# Patient Record
Sex: FEMALE | Employment: OTHER | URBAN - METROPOLITAN AREA
[De-identification: names, ages, dates, MRNs, and addresses within clinical notes are randomized per-mention and may not be internally consistent; named-entity substitution may affect disease eponyms.]

---

## 2019-02-07 ENCOUNTER — HOSPITAL ENCOUNTER (OUTPATIENT)
Dept: PHYSICAL THERAPY | Age: 79
Discharge: HOME | End: 2019-02-07
Payer: MEDICARE

## 2019-02-07 PROCEDURE — 97110 THERAPEUTIC EXERCISES: CPT

## 2019-02-07 PROCEDURE — 97161 PT EVAL LOW COMPLEX 20 MIN: CPT

## 2019-02-07 NOTE — PROGRESS NOTES
PT DAILY TREATMENT NOTE - [facility]  Patient Name: Abraham Fletcher Date:2019 : 1940 [x]  Patient  Verified Payor: [place] MED[place]ARE / Plan: Christin Oatesy / Product Type: Medicare / In time:10:34  Out time:11:12 Total Treatment Time (min): 38 Visit #: 1 of 10 Medicare/BCBS Only Total Timed Codes (min):  15 1:1 Treatment Time: 45 Treatment Area: Low back pain [M54.5] Cervicalgia [M54.2] SUBJECTIVE Pain Level (0-10 scale): 0 Any medication changes, allergies to medications, adverse drug reactions, diagnosis change, or new procedure performed?: [x] No    [] Yes (see summary sheet for update) Subjective functional status/changes:   [] No changes reported See POC OBJECTIVE 23 min [x]Eval                  []Re-Eval  
 
15 min Therapeutic Exercise:  [] See flow sheet : HEP instruction and demonstration, pt education regarding anatomy and physiology of the spine and LEs and how it relates to the pt's condition. Rationale: increase ROM and increase strength to improve the patients ability to tolerate ADLs With 
 [] TE 
 [] TA 
 [] neuro 
 [] other: Patient Education: [x] Review HEP [] Progressed/Changed HEP based on:  
[] positioning   [] body mechanics   [] transfers   [] heat/ice application   
[] other:   
 
Other Objective/Functional Measures: See evaluation. Pain Level (0-10 scale) post treatment: 0 
 
ASSESSMENT/Changes in Function: Pt given HEP handout to perform. Pt understood exercises in HEP handout. Educated pt that she can continue to use the [facility] as needed for stability. Pt demonstrated decreased AROM, decreased strength, muscle tightness, increased thoracic kyphosis and lumbar lordosis. Pelvic alignment and leg length WNL. L/s AROM did not increase referral right LE pain. Pt would benefit from physical therapy to improve the above impairments to help the pt return to performing ADLs, functional and recreational activities. Patient will continue to benefit from skilled PT services to modify and progress therapeutic interventions, address functional mobility deficits, address ROM deficits, address strength deficits, analyze and address soft tissue restrictions, analyze and cue movement patterns, analyze and modify body mechanics/ergonomics, assess and modify postural abnormalities, address imbalance/dizziness and instruct in home and community integration to attain remaining goals. [x]  See Plan of Care 
[]  See progress note/recertification 
[]  See Discharge Summary Progress towards goals / Updated goals: 
Short Term Goals: To be accomplished in 2 treatments: 1. Pt will report compliance and independence to HEP to help the pt manage their pain and symptoms. Eval: Established Long Term Goals: To be accomplished in 10 treatments: 1. Pt will increase FOTO score to 40 points to improve ability to perform ADLs. Eval: 56 points 2. Pt will increase MMT B hip flex to 4/5, left hip IR/ER to 4/5 to improve ability to tolerate lifting. Eval: B hip flex 4-/5, left hip IR/ER 4-/5 3. Pt will increase AROM c/s EXT to 50 degs, right SB to 20 degs, left SB to WNL, right rotation to WNL, left rotation to 60 degs to improve ability to move her head while driving. Eval: EXT 42 degs, right SB 13 degs, left SB 20 degs, right rotation 58 degs, left rotation 50 degs (all with pulling sensations, and increased c/s flex compensation noted with B SB AROM) 4. Pt will report being able to ambulate 3 blocks around her home with minimal to no increased LBP or right LE/hip pain to improve ability to perform recreational activities. Eval: reports ambulating 3 blocks around her home recently and had increased pain afterwards and needed to sit down because of pain. PLAN [x]  Upgrade activities as tolerated     [x]  Continue plan of care [x]  Update interventions per flow sheet      
[]  Discharge due to:_ 
[]  Other:_   
 
 Bret Oconnor, PT 2/7/2019  11:59 AM 
 
Future Appointments Date Time Provider José Jarvis 2/7/2019 10:30 AM Joie Lazaro, PT [facility]

## 2019-02-07 NOTE — PROGRESS NOTES
[facility] 
[address] 
[phone] fax Plan of Care/ Statement of Necessity for Physical Therapy Services Patient name: Cathy Jarvis Start of Care: 2019 Referral source: Haris Kessler DO : 1940 Medical Diagnosis: Low back pain [M54.5] Cervicalgia [M54.2] Onset Date: Most recent 2018 Treatment Diagnosis: LBP, right hip pain, neck pain Prior Hospitalization: see medical history Provider#: [id number] Medications: Verified on Patient summary List  
 Comorbidities: heart disease, osteoporosis, thyroid problems, hx breast cancer , left TKR  Prior Level of Function: Independent with ADls, functional, and recreational activities with on/off LBP but was able to lift and perform these activities without assistance. The Plan of Care and following information is based on the information from the initial evaluation. Assessment/ key information:  
Pt is a 66year old female who presents to therapy today with LBP, neck pain, and right hip pain. Pt states that her symptoms began in 2018 after sitting more often than normal while being out of town. Pt notices sporadic right buttock and lateral/anterior thigh pain at times as well and \"it feels like it wants to buckle and give out\". Pt reports having on/off LBP/neck pain before this onset but it did not stop her from her daily activities. Pt states she has increased difficulty with lifting, prolonged walking, and trouble reading. Pt states she has been using a SPC at times for ambulation for fear of her right LE giving out. Pt demonstrated decreased AROM, decreased strength, muscle tightness, increased thoracic kyphosis and lumbar lordosis. Pelvic alignment and leg length WNL. L/s AROM did not increase referral right LE pain.  Pt would benefit from physical therapy to improve the above impairments to help the pt return to performing ADLs, functional and recreational activities. Evaluation Complexity History HIGH Complexity :3+ comorbidities / personal factors will impact the outcome/ POC ; Examination HIGH Complexity : 4+ Standardized tests and measures addressing body structure, function, activity limitation and / or participation in recreation  ;Presentation LOW Complexity : Stable, uncomplicated  ;Clinical Decision Making MEDIUM Complexity : FOTO score of 26-74 Overall Complexity Rating: LOW Problem List: pain affecting function, decrease ROM, decrease strength, impaired gait/ balance, decrease ADL/ functional abilitiies, decrease activity tolerance, decrease flexibility/ joint mobility and decrease transfer abilities Treatment Plan may include any combination of the following: Therapeutic exercise, Therapeutic activities, Neuromuscular re-education, Physical agent/modality, Gait/balance training, Manual therapy, Patient education, Self Care training, Functional mobility training, Home safety training and Stair training Patient / Family readiness to learn indicated by: asking questions, trying to perform skills and interest 
Persons(s) to be included in education: patient (P) Barriers to Learning/Limitations: None Patient Goal (s): to strengthed area, so I can take care of my home and self Patient Self Reported Health Status: good Rehabilitation Potential: good Short Term Goals: To be accomplished in 2 treatments: 1. Pt will report compliance and independence to [facility] to help the pt manage their pain and symptoms. Long Term Goals: To be accomplished in 10 treatments: 1. Pt will increase FOTO score to 40 points to improve ability to perform ADLs. 2. Pt will increase MMT B hip flex to 4/5, left hip IR/ER to 4/5 to improve ability to tolerate lifting.  
3. Pt will increase AROM c/s EXT to 50 degs, right SB to 20 degs, left SB to WNL, right rotation to WNL, left rotation to 60 degs to improve ability to move her head while driving. 4. Pt will report being able to ambulate 3 blocks around her home with minimal to no increased LBP or right LE/hip pain to improve ability to perform recreational activities. Frequency / Duration: Patient to be seen 2 times per week for 10 treatments. Patient/ Caregiver education and instruction: Diagnosis, prognosis, self care, activity modification and exercises 
 [x]  Plan of care has been reviewed with PTA Certification Period: 2/7/2019 - 3/8/2019 Heike Skaggs, PT 2/7/2019 10:56 AM 
_____________________________________________________________________ I certify that the above Therapy Services are being furnished while the patient is under my care. I agree with the treatment plan and certify that this therapy is necessary. [de-identified] Signature:____________________  Date:__________Time:______ Please sign and return to [facility] [place] [address] 
[phone] fax

## 2019-02-12 ENCOUNTER — HOSPITAL ENCOUNTER (OUTPATIENT)
Dept: PHYSICAL THERAPY | Age: 79
Discharge: HOME | End: 2019-02-12
Payer: MEDICARE

## 2019-02-12 PROCEDURE — 97110 THERAPEUTIC EXERCISES: CPT

## 2019-02-12 PROCEDURE — 97112 NEUROMUSCULAR REEDUCATION: CPT

## 2019-02-12 NOTE — PROGRESS NOTES
PT DAILY TREATMENT NOTE - [facility]  Patient Name: Efren Jorge Date:2019 : 1940 [x]  Patient  Verified Payor: [place] MEDICARE / Plan: Christin Schneider y / Product Type: Medicare / In time: 2:45  Out time: 3:40 Total Treatment Time (min): 55 Visit #: 2 of 10 Medicare/BCBS Only Total Timed Codes (min):  55 1:1 Treatment Time: 45 Treatment Area: Low back pain [M54.5] Cervicalgia [M54.2] SUBJECTIVE Pain Level (0-10 scale): 1-2 Any medication changes, allergies to medications, adverse drug reactions, diagnosis change, or new procedure performed?: [x] No    [] Yes (see summary sheet for update) Subjective functional status/changes:   [] No changes reported Pt reports having some pain today and it continues to come and go. Pt reports attempting HEP and states she had some trouble with one of the HEP exercises. OBJECTIVE 40 min Therapeutic Exercise:  [x] See flow sheet :   
Rationale: increase ROM and increase strength to improve the patients ability to tolerate ADLs 15 min Neuromuscular Re-education:  [x]  See flow sheet : SB and core/trunk exercises Rationale: increase strength  to improve the patients ability to tolerate functional tasks. With 
 [] TE 
 [] TA 
 [] neuro 
 [] other: Patient Education: [x] Review HEP [] Progressed/Changed HEP based on:  
[] positioning   [] body mechanics   [] transfers   [] heat/ice application   
[] other:   
 
Other Objective/Functional Measures: Initiated exercises/interventions in flow sheet. Pain Level (0-10 scale) post treatment: 1-2 ASSESSMENT/Changes in Function: Reported no change in pain post session and no increased pain with exercises. Educated pt to perform HEP exercises to tolerance and avoid pain. Needed cueing to avoid neck flex compensation with chin tucks in supine. Needs cueing for proper positioning and form of mini squats to improve glute activation and limit UE support/use. Good/Fair TA contraction noted with core/trunk stability exercises. Continue POC as tolerated. Patient will continue to benefit from skilled PT services to modify and progress therapeutic interventions, address functional mobility deficits, address ROM deficits, address strength deficits, analyze and address soft tissue restrictions, analyze and cue movement patterns, analyze and modify body mechanics/ergonomics, assess and modify postural abnormalities, address imbalance/dizziness and instruct in home and community integration to attain remaining goals. []  See Plan of Care 
[]  See progress note/recertification 
[]  See Discharge Summary Progress towards goals / Updated goals: 
Short Term Goals: To be accomplished in 2 treatments: 1. Pt will report compliance and independence to HEP to help the pt manage their pain and symptoms. Eval: Established Current: reports attempting HEP Long Term Goals: To be accomplished in 10 treatments: 1. Pt will increase FOTO score to 40 points to improve ability to perform ADLs. Eval: 56 points 2. Pt will increase MMT B hip flex to 4/5, left hip IR/ER to 4/5 to improve ability to tolerate lifting. Eval: B hip flex 4-/5, left hip IR/ER 4-/5 3. Pt will increase AROM c/s EXT to 50 degs, right SB to 20 degs, left SB to WNL, right rotation to WNL, left rotation to 60 degs to improve ability to move her head while driving. Eval: EXT 42 degs, right SB 13 degs, left SB 20 degs, right rotation 58 degs, left rotation 50 degs (all with pulling sensations, and increased c/s flex compensation noted with B SB AROM) 4. Pt will report being able to ambulate 3 blocks around her home with minimal to no increased LBP or right LE/hip pain to improve ability to perform recreational activities. Eval: reports ambulating 3 blocks around her home recently and had increased pain afterwards and needed to sit down because of pain.   
 
PLAN 
 [x]  Upgrade activities as tolerated     [x]  Continue plan of care [x]  Update interventions per flow sheet      
[]  Discharge due to:_ 
[]  Other:_ Meg Guzman, PT 2/12/2019  3:01 PM 
 
Future Appointments Date Time Provider José Jarvis 2/12/2019  5:00 PM Mathew Carolina, [facility]  
2/15/2019  2:45 PM [facility]  
2/18/2019  9:45 AM Ann Gutierrez, PT [facility]  
2/21/2019  8:15 AM Mathew Carolina, PT [facility]  
2/25/2019  2:00 PM Adin Walters, PT [facility]  
2/28/2019  8:15 AM Ivan Lawrence, PT [facility]

## 2019-02-15 ENCOUNTER — HOSPITAL ENCOUNTER (OUTPATIENT)
Dept: PHYSICAL THERAPY | Age: 79
Discharge: HOME | End: 2019-02-15
Payer: MEDICARE

## 2019-02-15 PROCEDURE — 97110 THERAPEUTIC EXERCISES: CPT

## 2019-02-15 PROCEDURE — 97112 NEUROMUSCULAR REEDUCATION: CPT

## 2019-02-18 ENCOUNTER — HOSPITAL ENCOUNTER (OUTPATIENT)
Dept: PHYSICAL THERAPY | Age: 79
Discharge: HOME | End: 2019-02-18
Payer: MEDICARE

## 2019-02-18 PROCEDURE — 97110 THERAPEUTIC EXERCISES: CPT

## 2019-02-18 PROCEDURE — 97112 NEUROMUSCULAR REEDUCATION: CPT

## 2019-02-18 NOTE — PROGRESS NOTES
PT DAILY TREATMENT NOTE 10-18 Patient Name: Ana Miranda Date:2019 : 1940 [x]  Patient  Verified Payor: [place] MEDICARE / Plan: Christin Mckeon / Product Type: Medicare / In time:940  Out time:1035 Total Treatment Time (min): 55 Visit #: 4 of 10 Medicare/BCBS Only Total Timed Codes (min):  55 1:1 Treatment Time:  50 Treatment Area: Low back pain [M54.5] Cervicalgia [M54.2] SUBJECTIVE Pain Level (0-10 scale): 1-2 Any medication changes, allergies to medications, adverse drug reactions, diagnosis change, or new procedure performed?: [x] No    [] Yes (see summary sheet for update) Subjective functional status/changes:   [] No changes reported Every now and then I get a sharp pain in the middle of my back that started Friday after coming here. OBJECTIVE Modality rationale:   
Type Additional Details  
[] Estim:  []Unatt       []IFC  []Premod []Other:  []w/ice   []w/heat Position: Location:  
[] Estim: []Att    []TENS instruct  []NMES []Other:  []w/US   []w/ice   []w/heat Position: Location:  
[]  Traction: [] Cervical       []Lumbar 
                     [] Prone          []Supine []Intermittent   []Continuous Lbs: 
[] before manual 
[] after manual  
[]  Ultrasound: []Continuous   [] Pulsed []1MHz   []3MHz W/cm2: 
Location:  
[]  Iontophoresis with dexamethasone Location: [] Take home patch  
[] In clinic  
[]  Ice     []  heat 
[]  Ice massage 
[]  Laser  
[]  Anodyne Position: Location:  
[]  Laser with stim 
[]  Other:  Position: Location:  
[]  Vasopneumatic Device Pressure:       [] lo [] med [] hi  
Temperature: [] lo [] med [] hi  
[] Skin assessment post-treatment:  []intact []redness- no adverse reaction 
  []redness  adverse reaction:  
 
10 min Therapeutic Exercise:  [x] See flow sheet :  
 Rationale: increase ROM and increase strength to improve the patients ability to perform ADL 
 
45 min Neuromuscular Re-education:  [x]  See flow sheet :  
Rationale: increase strength and improve coordination  to improve the patients ability to improve core activation in order to reduce lumbar strain with daily tasks With 
 [] TE 
 [] TA 
 [] neuro 
 [] other: Patient Education: [x] Review HEP [] Progressed/Changed HEP based on:  
[] positioning   [] body mechanics   [] transfers   [] heat/ice application   
[] other:   
 
Other Objective/Functional Measures: completed exercises per flow sheet Pain Level (0-10 scale) post treatment: 1-2 ASSESSMENT/Changes in Function: Patient reports mid lower back pain at start of session; states that she feels this pain with lifting tasks, making the bed. No pain increase during routine, with good core activation during exercises. Patient will continue to benefit from skilled PT services to modify and progress therapeutic interventions, address functional mobility deficits, address ROM deficits, address strength deficits, analyze and address soft tissue restrictions, analyze and cue movement patterns, analyze and modify body mechanics/ergonomics, assess and modify postural abnormalities, address imbalance/dizziness and instruct in home and community integration to attain remaining goals. []  See Plan of Care 
[]  See progress note/recertification 
[]  See Discharge Summary Progress towards goals / Updated goals: 
Short Term Goals: To be accomplished in 2 treatments: 1. Pt will report compliance and independence to HEP to help the pt manage their pain and symptoms.            
Eval: Established  
Current: reports attempting HEP  
Long Term Goals: To be accomplished in 10 treatments: 1. Pt will increase FOTO score to 40 points to improve ability to perform ADLs. Eval: 56 points Current status: reassess at MD note 2. Pt will increase MMT B hip flex to 4/5, left hip IR/ER to 4/5 to improve ability to tolerate lifting. Eval: B hip flex 4-/5, left hip IR/ER 4-/5 Current status: reassess next week 3. Pt will increase AROM c/s EXT to 50 degs, right SB to 20 degs, left SB to WNL, right rotation to WNL, left rotation to 60 degs to improve ability to move her head while driving. Eval: EXT 42 degs, right SB 13 degs, left SB 20 degs, right rotation 58 degs, left rotation 50 degs (all with pulling sensations, and increased c/s flex compensation noted with B SB AROM) Current status: reassess next visit 4. Pt will report being able to ambulate 3 blocks around her home with minimal to no increased LBP or right LE/hip pain to improve ability to perform recreational activities. Eval: reports ambulating 3 blocks around her home recently and had increased pain afterwards and needed to sit down because of pain. Current status: reassess next visit PLAN [x]  Upgrade activities as tolerated     [x]  Continue plan of care 
[]  Update interventions per flow sheet      
[]  Discharge due to:_ 
[]  Other:_   
 
Noam Conway, PT 2/18/2019  8:39 AM 
 
Future Appointments Date Time Provider José Jarvis 2/18/2019  9:45 AM July Schafer, [facility]  
2/21/2019  8:15 AM Abdoul Kan, [facility]  
2/25/2019  2:00 PM Manny Walters, [facility]  
2/28/2019  8[facility]

## 2019-02-21 ENCOUNTER — HOSPITAL ENCOUNTER (OUTPATIENT)
Dept: PHYSICAL THERAPY | Age: 79
Discharge: HOME | End: 2019-02-21
Payer: MEDICARE

## 2019-02-21 PROCEDURE — 97112 NEUROMUSCULAR REEDUCATION: CPT

## 2019-02-21 PROCEDURE — 97110 THERAPEUTIC EXERCISES: CPT

## 2019-02-21 NOTE — PROGRESS NOTES
PT DAILY TREATMENT NOTE 10-18 Patient Name: Robert Bae Date:2019 : 1940 [x]  Patient  Verified Payor: [place] MEDICARE / Plan: Christin Mckeon / Product Type: Medicare / In time: 8:15  Out time: 9:09 Total Treatment Time (min): 54 Visit #: 5 of 10 Medicare/BCBS Only Total Timed Codes (min):  54 1:1 Treatment Time:  54 Treatment Area: Low back pain [M54.5] Cervicalgia [M54.2] SUBJECTIVE Pain Level (0-10 scale): 0.5 Any medication changes, allergies to medications, adverse drug reactions, diagnosis change, or new procedure performed?: [x] No    [] Yes (see summary sheet for update) Subjective functional status/changes:   [] No changes reported Pt reports that she has not done much this morning. Pt reported that she noticed having some dizziness with head movement last week but it was intermittent and not constant. OBJECTIVE 10 min Therapeutic Exercise:  [x] See flow sheet :  
Rationale: increase ROM and increase strength to improve the patients ability to perform ADL 
 
44 min Neuromuscular Re-education:  [x]  See flow sheet :  
Rationale: increase strength and improve coordination  to improve the patients ability to improve core activation in order to reduce lumbar strain with daily tasks With 
 [] TE 
 [] TA 
 [] neuro 
 [] other: Patient Education: [x] Review HEP [] Progressed/Changed HEP based on:  
[] positioning   [] body mechanics   [] transfers   [] heat/ice application   
[] other:   
 
Other Objective/Functional Measures: C/s AROM: EXT 45 degs, right SB 18 degs, left SB 19 degs, right rotation 62 degs, left rotation 52 degs with pulling in neck. Pain Level (0-10 scale) post treatment: 0 tightness ASSESSMENT/Changes in Function: Reported no pain post session, only tightness in the back and right LE region. Improvement in c/s AROM EXT/right SB/B rotation noted since the evaluation.  Gave pt green tband for HEP exercises and educated pt that she can perform hipx3 exercise at home with a green tband while holding onto a countertop. Pt asked if she can walk at the mall and educated pt to bring her [facility] with her and she can walk around the mall as long as it does not increase pain. Educated pt that dizziness can be cervicogenic in nature and continue to monitor this dizziness at home. Continue POC as tolerated. Patient will continue to benefit from skilled PT services to modify and progress therapeutic interventions, address functional mobility deficits, address ROM deficits, address strength deficits, analyze and address soft tissue restrictions, analyze and cue movement patterns, analyze and modify body mechanics/ergonomics, assess and modify postural abnormalities, address imbalance/dizziness and instruct in home and community integration to attain remaining goals. []  See Plan of Care 
[]  See progress note/recertification 
[]  See Discharge Summary Progress towards goals / Updated goals: 
Short Term Goals: To be accomplished in 2 treatments: 1. Pt will report compliance and independence to HEP to help the pt manage their pain and symptoms.            
Eval: Established  
Current: reports attempting HEP  
Long Term Goals: To be accomplished in 10 treatments: 1. Pt will increase FOTO score to 40 points to improve ability to perform ADLs. Eval: 56 points Current status: reassess at MD note 2. Pt will increase MMT B hip flex to 4/5, left hip IR/ER to 4/5 to improve ability to tolerate lifting. Eval: B hip flex 4-/5, left hip IR/ER 4-/5 Current status: reassess next week 3. Pt will increase AROM c/s EXT to 50 degs, right SB to 20 degs, left SB to WNL, right rotation to WNL, left rotation to 60 degs to improve ability to move her head while driving.  
Eval: EXT 42 degs, right SB 13 degs, left SB 20 degs, right rotation 58 degs, left rotation 50 degs (all with pulling sensations, and increased c/s flex compensation noted with B SB AROM) Current status: EXT 45 degs, right SB 18 degs, left SB 19 degs, right rotation 62 degs, left rotation 52 degs with pulling in neck. 4. Pt will report being able to ambulate 3 blocks around her home with minimal to no increased LBP or right LE/hip pain to improve ability to perform recreational activities. Eval: reports ambulating 3 blocks around her home recently and had increased pain afterwards and needed to sit down because of pain. Current status: reports she has not tried walking around the block at home yet PLAN [x]  Upgrade activities as tolerated     [x]  Continue plan of care [x]  Update interventions per flow sheet      
[]  Discharge due to:_ 
[]  Other:_ Bryant Heaton, PT 2/21/2019  8:21 AM 
 
Future Appointments Date Time Provider José Jarvis 2/25/2019  2:00 PM Odell Walters, PT [facility]  
2/28/2019  8:15 AM Bertrand Balderas, PT [facility]

## 2019-02-25 ENCOUNTER — HOSPITAL ENCOUNTER (OUTPATIENT)
Dept: PHYSICAL THERAPY | Age: 79
Discharge: HOME | End: 2019-02-25
Payer: MEDICARE

## 2019-02-25 PROCEDURE — 97112 NEUROMUSCULAR REEDUCATION: CPT

## 2019-02-25 NOTE — PROGRESS NOTES
PT DAILY TREATMENT NOTE 10-18 Patient Name: Griffin Garcia Date:2019 : 1940 [x]  Patient  Verified Payor: [place] MED[place]ARE / Plan: Christin Mckeon / Product Type: Medicare / In time: 2:00  Out time: 2:54 Total Treatment Time (min):54 Visit #: 6 of 10 Medicare/BCBS Only Total Timed Codes (min): 54  1:1 Treatment Time: 48 Treatment Area: Low back pain [M54.5] Cervicalgia [M54.2] SUBJECTIVE Pain Level (0-10 scale): 0/10 Any medication changes, allergies to medications, adverse drug reactions, diagnosis change, or new procedure performed?: [x] No    [] Yes (see summary sheet for update) Subjective functional status/changes:   [] No changes reported \"I feel good. I forgot the cane this morning and I had to go to [facility].  I was able to walk around without any pain or issues. I didn't bring the cane here because I wanted you all to see me walk. \" OBJECTIVE 10 min Therapeutic Exercise:  [x] See flow sheet :  
Rationale: increase ROM and increase strength to improve the patients ability to perform ADLs 38 min Neuromuscular Re-education:  [x]  See flow sheet :  
Rationale: increase strength and improve coordination  to improve the patients ability to improve core activation in order to reduce lumbar strain with daily tasks With 
 [] TE 
 [] TA 
 [] neuro 
 [] other: Patient Education: [x] Review HEP [] Progressed/Changed HEP based on:  
[] positioning   [] body mechanics   [] transfers   [] heat/ice application   
[] other:   
 
Other Objective/Functional Measures: Increased exercises per flow sheet. Pain Level (0-10 scale) post treatment: 0/10 ASSESSMENT/Changes in Function: Pt steadily progressing with skilled therapy. Pt continues to exhibit left quad weakness, having to use hand to assist in lifting left UE to place on step or platform. Pt continues to note right hip discomfort with certain movements. Patient will continue to benefit from skilled PT services to modify and progress therapeutic interventions, address functional mobility deficits, address ROM deficits, address strength deficits, analyze and address soft tissue restrictions, analyze and cue movement patterns, analyze and modify body mechanics/ergonomics, assess and modify postural abnormalities, address imbalance/dizziness and instruct in home and community integration to attain remaining goals. []  See Plan of Care 
[]  See progress note/recertification 
[]  See Discharge Summary Progress towards goals / Updated goals: 
Short Term Goals: To be accomplished in 2 treatments: 1. Pt will report compliance and independence to HEP to help the pt manage their pain and symptoms.            
Eval: Established  
Current: met - Pt reports compliance with HEP Long Term Goals: To be accomplished in 10 treatments: 1. Pt will increase FOTO score to 40 points to improve ability to perform ADLs. Eval: 56 points Current status: reassess at MD note 2. Pt will increase MMT B hip flex to 4/5, left hip IR/ER to 4/5 to improve ability to tolerate lifting. Eval: B hip flex 4-/5, left hip IR/ER 4-/5 Current status: progressing - Hip flex 5/5 right, 4-/5 left; Hip IR/ER left 4+/5 grossly 3. Pt will increase AROM c/s EXT to 50 degs, right SB to 20 degs, left SB to WNL, right rotation to WNL, left rotation to 60 degs to improve ability to move her head while driving. Eval: EXT 42 degs, right SB 13 degs, left SB 20 degs, right rotation 58 degs, left rotation 50 degs (all with pulling sensations, and increased c/s flex compensation noted with B SB AROM) Current status: progressing - Ext 45 degs, right SB 18 degs, left SB 19 degs, right rotation 62 degs, left rotation 52 degs with pulling in neck.  
4. Pt will report being able to ambulate 3 blocks around her home with minimal to no increased LBP or right LE/hip pain to improve ability to perform recreational activities. Eval: reports ambulating 3 blocks around her home recently and had increased pain afterwards and needed to sit down because of pain. Current status: met - Pt able to amb through [facility] without pain or difficulty PLAN [x]  Upgrade activities as tolerated     [x]  Continue plan of care [x]  Update interventions per flow sheet      
[]  Discharge due to:_ 
[]  Other:_   
 
Cory Walters, PT 2/25/2019  8:21 AM 
 
Future Appointments Date Time Provider Jsoé Jarvis 2/28/2019  8:15 AM Ifeoma Robles, [facility]  
3/5/2019  2:00 PM Ifeoma Robles [facility]  
3/7/2019  8:15 AM Hever Alexis, [facility]  
3/12/2019  9:00 AM Salas Jaramillo, PT [facility]

## 2019-02-28 ENCOUNTER — HOSPITAL ENCOUNTER (OUTPATIENT)
Dept: PHYSICAL THERAPY | Age: 79
Discharge: HOME | End: 2019-02-28
Payer: MEDICARE

## 2019-02-28 PROCEDURE — 97110 THERAPEUTIC EXERCISES: CPT

## 2019-02-28 PROCEDURE — 97530 THERAPEUTIC ACTIVITIES: CPT

## 2019-02-28 PROCEDURE — 97112 NEUROMUSCULAR REEDUCATION: CPT

## 2019-02-28 NOTE — PROGRESS NOTES
PT DAILY TREATMENT NOTE 10-18 Patient Name: Blayne Baer Date:2019 : 1940 [x]  Patient  Verified Payor: [place] MEDICARE / Plan: Christin Schneider y / Product Type: Medicare / In time: 8:16  Out time: 8:54 Total Treatment Time (min): 38 Visit #: 7 of 10 Medicare/BCBS Only Total Timed Codes (min): 38  1:1 Treatment Time: 45 Treatment Area: Low back pain [M54.5] Cervicalgia [M54.2] SUBJECTIVE Pain Level (0-10 scale): 0 Any medication changes, allergies to medications, adverse drug reactions, diagnosis change, or new procedure performed?: [x] No    [] Yes (see summary sheet for update) Subjective functional status/changes:   [] No changes reported Pt states she has no pain currently. Pt reports that she feels her heart rate is really fast. Pt reports that she may have eaten something bad last night and her stomach is not feeling well too. Pt reports that this does not happen often. OBJECTIVE 20 min Therapeutic Exercise:  [x] See flow sheet :  
Rationale: increase ROM and increase strength to improve the patients ability to perform ADLs 8 min Therapeutic Activity:  []  See flow sheet : resting heart rate readings with pulse ox (see objective below). Rationale: assess the pt's heart rate secondary to subjective comments. 10 min Neuromuscular Re-education:  [x]  See flow sheet :  
Rationale: increase strength and improve coordination  to improve the patients ability to improve core activation in order to reduce lumbar strain with daily tasks With 
 [] TE 
 [] TA 
 [] neuro 
 [] other: Patient Education: [x] Review HEP [] Progressed/Changed HEP based on:  
[] positioning   [] body mechanics   [] transfers   [] heat/ice application   
[] other:   
 
Other Objective/Functional Measures: resting heart rate via pulse ox pre-session: ranged from  beats per minute.  Resting heart rate via pulse ox mid-session: ranged from 84-85 beats per minute. Resting heart rate via pulse ox post-session: ranged from 81-87 beats per minute. Pain Level (0-10 scale) post treatment: 0 
 
ASSESSMENT/Changes in Function: Held some exercises in flow sheet secondary to subjective comments of her heart rate. Educated pt to go to the MD/ED if her heart rate becomes elevated/sporatic again. Did not report any signs or symptoms of increased tachycardia with exercises today. Improvement in pt's resting heart rate noted post session. Continue POC as tolerated. Patient will continue to benefit from skilled PT services to modify and progress therapeutic interventions, address functional mobility deficits, address ROM deficits, address strength deficits, analyze and address soft tissue restrictions, analyze and cue movement patterns, analyze and modify body mechanics/ergonomics, assess and modify postural abnormalities, address imbalance/dizziness and instruct in home and community integration to attain remaining goals. []  See Plan of Care 
[]  See progress note/recertification 
[]  See Discharge Summary Progress towards goals / Updated goals: 
Short Term Goals: To be accomplished in 2 treatments: 1. Pt will report compliance and independence to HEP to help the pt manage their pain and symptoms.            
Eval: Established  
Current: met - Pt reports compliance with HEP Long Term Goals: To be accomplished in 10 treatments: 1. Pt will increase FOTO score to 40 points to improve ability to perform ADLs. Eval: 56 points Current status: reassess at MD note 2. Pt will increase MMT B hip flex to 4/5, left hip IR/ER to 4/5 to improve ability to tolerate lifting. Eval: B hip flex 4-/5, left hip IR/ER 4-/5 Current status: progressing - Hip flex 5/5 right, 4-/5 left; Hip IR/ER left 4+/5 grossly 3.  Pt will increase AROM c/s EXT to 50 degs, right SB to 20 degs, left SB to WNL, right rotation to WNL, left rotation to 60 degs to improve ability to move her head while driving. Eval: EXT 42 degs, right SB 13 degs, left SB 20 degs, right rotation 58 degs, left rotation 50 degs (all with pulling sensations, and increased c/s flex compensation noted with B SB AROM) Current status: reports no pain in the neck today but did not assess pt's ROM secondary to pt report of having tachycardia today 4. Pt will report being able to ambulate 3 blocks around her home with minimal to no increased LBP or right LE/hip pain to improve ability to perform recreational activities. Eval: reports ambulating 3 blocks around her home recently and had increased pain afterwards and needed to sit down because of pain. Current status: met - Pt able to amb through [facility] without pain or difficulty PLAN [x]  Upgrade activities as tolerated     [x]  Continue plan of care [x]  Update interventions per flow sheet      
[]  Discharge due to:_ 
[]  Other:_ Maria Payne, PT 2/28/2019  8:30 AM 
 
Future Appointments Date Time Provider José Jarvis 2/28/2019  8:15 AM Josias Garvey, PT [facility]  
3/5/2019  2:00 PM Josias Garvey, PT [facility]  
3/7/2019  8:15 AM Martha Valdez, PT [facility]  
3/12/2019  9:00 AM Kelsi Valdivia, PT [facility]

## 2019-03-05 ENCOUNTER — HOSPITAL ENCOUNTER (OUTPATIENT)
Dept: PHYSICAL THERAPY | Age: 79
Discharge: HOME | End: 2019-03-05
Payer: MEDICARE

## 2019-03-05 PROCEDURE — 97110 THERAPEUTIC EXERCISES: CPT

## 2019-03-05 PROCEDURE — 97112 NEUROMUSCULAR REEDUCATION: CPT

## 2019-03-05 NOTE — PROGRESS NOTES
PT DAILY TREATMENT NOTE 10-18    Patient Name: Julian Tijerina  Date:3/5/2019  : 1940  [x]  Patient  Verified  Payor: [place] MEDICARE / Plan: VA MEDICARE PART A & B / Product Type: Medicare /    In time: 2:01   Out time: 2:51  Total Treatment Time (min): 50  Visit #: 8 of 10    Medicare/BCBS Only   Total Timed Codes (min): 50  1:1 Treatment Time: 50       Treatment Area: Low back pain [M54.5]  Cervicalgia [M54.2]    SUBJECTIVE  Pain Level (0-10 scale): 1 \"discomfort\" low back  Any medication changes, allergies to medications, adverse drug reactions, diagnosis change, or new procedure performed?: [x] No    [] Yes (see summary sheet for update)  Subjective functional status/changes:   [] No changes reported  Pt reports having some low back discomfort today. Pt denies having any tachycardia today. Pt reports that her neck is feeling better overall. OBJECTIVE    20 min Therapeutic Exercise:  [x] See flow sheet :   Rationale: increase ROM and increase strength to improve the patients ability to perform ADLs     30 min Neuromuscular Re-education:  [x]  See flow sheet :   Rationale: increase strength and improve coordination  to improve the patients ability to improve core activation in order to reduce lumbar strain with daily tasks    With   [] TE   [] TA   [] neuro   [] other: Patient Education: [x] Review HEP    [] Progressed/Changed HEP based on:   [] positioning   [] body mechanics   [] transfers   [] heat/ice application    [] other:      Other Objective/Functional Measures: Increased reps per flow sheet to improve strength and stability. Needs cueing to avoid hip rotation with open books exercise. Needs cueing to avoid excessive hip ER with lateral bandwalks. Added assisted purple squats to improve core stability and strength. Pain Level (0-10 scale) post treatment: 0    ASSESSMENT/Changes in Function: Reported no pain post session.  Gave pt updated HEP with weight bearing/standing exercises and green tband to improve core stability and strength. Also gave pt green tband for HEP exercises to improve strength in the LEs. Continue POC as tolerated. Patient will continue to benefit from skilled PT services to modify and progress therapeutic interventions, address functional mobility deficits, address ROM deficits, address strength deficits, analyze and address soft tissue restrictions, analyze and cue movement patterns, analyze and modify body mechanics/ergonomics, assess and modify postural abnormalities, address imbalance/dizziness and instruct in home and community integration to attain remaining goals. []  See Plan of Care  []  See progress note/recertification  []  See Discharge Summary         Progress towards goals / Updated goals:  Short Term Goals: To be accomplished in 2 treatments:  1. Pt will report compliance and independence to HEP to help the pt manage their pain and symptoms.             Eval: Established   Current: met - Pt reports compliance with HEP  Long Term Goals: To be accomplished in 10 treatments:  1. Pt will increase FOTO score to 40 points to improve ability to perform ADLs. Eval: 56 points  Current status: reassess at MD note  2. Pt will increase MMT B hip flex to 4/5, left hip IR/ER to 4/5 to improve ability to tolerate lifting. Eval: B hip flex 4-/5, left hip IR/ER 4-/5  Current status: progressing - Hip flex 5/5 right, 4-/5 left; Hip IR/ER left 4+/5 grossly  3. Pt will increase AROM c/s EXT to 50 degs, right SB to 20 degs, left SB to WNL, right rotation to WNL, left rotation to 60 degs to improve ability to move her head while driving. Eval: EXT 42 degs, right SB 13 degs, left SB 20 degs, right rotation 58 degs, left rotation 50 degs (all with pulling sensations, and increased c/s flex compensation noted with B SB AROM)  Current status: reports no pain in the neck today but did not assess pt's ROM secondary to pt report of having tachycardia today  4.  Pt will report being able to ambulate 3 blocks around her home with minimal to no increased LBP or right LE/hip pain to improve ability to perform recreational activities. Eval: reports ambulating 3 blocks around her home recently and had increased pain afterwards and needed to sit down because of pain.    Current status: met - Pt able to amb through [facility] without pain or difficulty    PLAN  [x]  Upgrade activities as tolerated     [x]  Continue plan of care  [x]  Update interventions per flow sheet       []  Discharge due to:_  []  Other:_      Scooter Fay, PT 3/5/2019  2:05 PM    Future Appointments   Date Time Provider José Jarvis   3/5/2019  2:00 PM [facility]   3/7/2019  8:15 AM Rory May, [facility]   3/12/2019  9:00 AM Tiffanie Lindsay, PT [facility]

## 2019-03-07 ENCOUNTER — APPOINTMENT (OUTPATIENT)
Dept: PHYSICAL THERAPY | Age: 79
End: 2019-03-07

## 2019-03-07 ENCOUNTER — HOSPITAL ENCOUNTER (OUTPATIENT)
Dept: PHYSICAL THERAPY | Age: 79
Discharge: HOME | End: 2019-03-07
Payer: MEDICARE

## 2019-03-07 PROCEDURE — 97112 NEUROMUSCULAR REEDUCATION: CPT

## 2019-03-07 PROCEDURE — 97110 THERAPEUTIC EXERCISES: CPT

## 2019-03-07 NOTE — PROGRESS NOTES
[facility], [address] [place] [address]  [phone] fax    Continued Plan of Care/ Re-certification for Physical Therapy Services      Patient name: Ladi Lawson Start of Care: 2019   Referral source: Allison Montoya DO : 1940               Medical Diagnosis: Low back pain [M54.5]  Cervicalgia [M54.2]    Onset Date: Most recent 2018               Treatment Diagnosis: LBP, right hip pain, neck pain   Prior Hospitalization: see medical history Provider#: [id number]   Medications: Verified on Patient summary List    Comorbidities: heart disease, osteoporosis, thyroid problems, hx breast cancer , left TKR    Prior Level of Function: Independent with ADls, functional, and recreational activities with on/off LBP but was able to lift and perform these activities without assistance. Visits from Start of Care: 9    Missed Visits: 0    The Plan of Care and following information is based on the patient's current status:  Short Term Goals: To be accomplished in 2 treatments:  1. Pt will report compliance and independence to HEP to help the pt manage their pain and symptoms.             Eval: Established   Current: met - Pt reports compliance with HEP  Long Term Goals: To be accomplished in 10 treatments:  1. Pt will increase FOTO score to 56 points to improve ability to perform ADLs. Eval: 40 points  Current status: progressing, 51 pts  2. Pt will increase MMT B hip flex to 4/5, left hip IR/ER to 4/5 to improve ability to tolerate lifting. Eval: B hip flex 4-/5, left hip IR/ER 4-/5  Current status: met, hip flex 5/5, Left hip IR/ER 5/5  3. Pt will increase AROM c/s EXT to 50 degs, right SB to 20 degs, left SB to WNL, right rotation to WNL, left rotation to 60 degs to improve ability to move her head while driving.   Eval: EXT 42 degs, right SB 13 degs, left SB 20 degs, right rotation 58 degs, left rotation 50 degs (all with pulling sensations, and increased c/s flex compensation noted with B SB AROM)  Current status: progressing, ext 48 deg, Right S/B 39 deg, Left S/B 22 deg, Rotation Right 65 deg, Rotation Left 36 deg  4. Pt will report being able to ambulate 3 blocks around her home with minimal to no increased LBP or right LE/hip pain to improve ability to perform recreational activities. Eval: reports ambulating 3 blocks around her home recently and had increased pain afterwards and needed to sit down because of pain.   Current status: met - Pt able to amb through [facility] without pain or difficulty    Key functional changes:   Functional Gains: was improving in dynamic balance with less use of cane, less pain, cervical ROM, hip strength  Functional Deficits: balance recently (using cane at reassessment), prolonged walking, prolonged standing (greater than 15 minutes), bending, some neck strain with lifting to cabinet, lifting laundry  % improvement: 25%  Pain   Average: 1-2/10       Best: 0/10     Worst: 3/10  Patient Goal: \"I don't want to be sedentary for the rest of my life\"      Problems/ barriers to goal attainment: none     Problem List: pain affecting function, decrease ROM, decrease strength, edema affecting function, impaired gait/ balance, decrease ADL/ functional abilitiies, decrease activity tolerance, decrease flexibility/ joint mobility and decrease transfer abilities    Treatment Plan: Therapeutic exercise, Therapeutic activities, Neuromuscular re-education, Physical agent/modality, Gait/balance training, Manual therapy, Aquatic therapy, Patient education, Self Care training, Functional mobility training, Home safety training and Stair training     Goals for this certification period to be accomplished in 10 treatments:  Goal: Patient will increase FOTO score 56 pts to improve ability to perform ADLs.   Status at last note/certification: 51 pts  Goal: Patient will improve cervical Left rotation and lateral flexion by at least 10 deg in order to improve ease of driving, turning head. Status at last note/certification: Left lateral flexion 22 deg, Rotation Left 36 deg  Goal: Patient will report ability to consistent ambulate community distances without AD in order to indicate improved confidence and stability with mobility. Status at last note/certification: was walking without AD for about 10 days but recent reliance on cane due to instability  Goal: Patient will report overall at least 60% improvement in function in order to progress toward personal goals. Status at last note/certification: 10%    Frequency / Duration: Patient to be seen 2 times per week for 10 treatments:    Assessment / Recommendations:Patient has consistently attended therapy for low back and hip pain and neck pain, with good results until earlier this week, where she began to feel increased pain and instability. Recommend continuing with skilled physical therapy in order to maximize strength and stability in order to improve ease of daily tasks, ambulation. Certification Period: 3/7/19 to 4/5/19    Denzel Bowen, PT 3/7/2019 7:11 AM    ________________________________________________________________________  I certify that the above Therapy Services are being furnished while the patient is under my care. I agree with the treatment plan and certify that this therapy is necessary. [] I have read the above and request that my patient continue as recommended.   [] I have read the above report and request that my patient continue therapy with the following changes/special instructions: ______________________________________  [] I have read the above report and request that my patient be discharged from therapy    Physician's Signature:____________Date:_________TIME:________    ** Signature, Date and Time must be completed for valid certification **    Please sign and return to [facility]  [address]  [phone] fax

## 2019-03-07 NOTE — PROGRESS NOTES
PT DAILY TREATMENT NOTE 10-18    Patient Name: Abraham Fletcher  Date:3/7/2019  : 1940  [x]  Patient  Verified  Payor: [place] MEDICARE / Plan: [place] MEDICARE PART A & B / Product Type: Medicare /    In time:815  Out time:912  Total Treatment Time (min): 57  Visit #: 9 of 10    Medicare/BCBS Only   Total Timed Codes (min):  47 1:1 Treatment Time:  47       Treatment Area: Low back pain [M54.5]  Cervicalgia [M54.2]    SUBJECTIVE  Pain Level (0-10 scale): 1  Any medication changes, allergies to medications, adverse drug reactions, diagnosis change, or new procedure performed?: [x] No    [] Yes (see summary sheet for update)  Subjective functional status/changes:   [] No changes reported  I've had a lot of pain and instability since Tuesday so I'm back with the cane today.      OBJECTIVE    Modality rationale: decrease pain and increase tissue extensibility to improve the patients ability to improve activity tolerance   Min Type Additional Details    [] Estim:  []Unatt       []IFC  []Premod                        []Other:  []w/ice   []w/heat  Position:  Location:    [] Estim: []Att    []TENS instruct  []NMES                    []Other:  []w/US   []w/ice   []w/heat  Position:  Location:    []  Traction: [] Cervical       []Lumbar                       [] Prone          []Supine                       []Intermittent   []Continuous Lbs:  [] before manual  [] after manual    []  Ultrasound: []Continuous   [] Pulsed                           []1MHz   []3MHz W/cm2:  Location:    []  Iontophoresis with dexamethasone         Location: [] Take home patch   [] In clinic   10 []  Ice     [x]  heat  []  Ice massage  []  Laser   []  Anodyne Position:supine  Location:low back    []  Laser with stim  []  Other:  Position:  Location:    []  Vasopneumatic Device Pressure:       [] lo [] med [] hi   Temperature: [] lo [] med [] hi   [] Skin assessment post-treatment:  []intact []redness- no adverse reaction    []redness  adverse reaction:     10 min Therapeutic Exercise:  [x] See flow sheet :   Rationale: increase ROM and increase strength to improve the patients ability to perform ADL     37 min Neuromuscular Re-education:  [x]  See flow sheet :   Rationale: increase strength, improve coordination and increase proprioception  to improve the patients ability to improve core activation in order to reduce lumbar strain with daily tasks    With   [] TE   [] TA   [] neuro   [] other: Patient Education: [x] Review HEP    [] Progressed/Changed HEP based on:   [] positioning   [] body mechanics   [] transfers   [] heat/ice application    [] other:      Other Objective/Functional Measures: see re-certification     Pain Level (0-10 scale) post treatment: 0    ASSESSMENT/Changes in Function: Patient with reports of increased strain today; believes it may be related to lateral ambulation with band from last session so held. Good improvement in hip strength, and improving neck AROM except with Left rotation/lateral flexion. Will progress as able; re-certification sent to MD.     Patient will continue to benefit from skilled PT services to modify and progress therapeutic interventions, address functional mobility deficits, address ROM deficits, address strength deficits, analyze and address soft tissue restrictions, analyze and cue movement patterns, analyze and modify body mechanics/ergonomics, assess and modify postural abnormalities, address imbalance/dizziness and instruct in home and community integration to attain remaining goals. []  See Plan of Care  [x]  See progress note/recertification  []  See Discharge Summary         Progress towards goals / Updated goals:  Short Term Goals: To be accomplished in 2 treatments:  1. Pt will report compliance and independence to HEP to help the pt manage their pain and symptoms.             Eval: Established   Current: met - Pt reports compliance with HEP  Long Term Goals: To be accomplished in 10 treatments:  1. Pt will increase FOTO score to 40 points to improve ability to perform ADLs. Eval: 56 points  Current status: progressing, 51 pts  2. Pt will increase MMT B hip flex to 4/5, left hip IR/ER to 4/5 to improve ability to tolerate lifting. Eval: B hip flex 4-/5, left hip IR/ER 4-/5  Current status: met, hip flex 5/5, Left hip IR/ER 5/5  3. Pt will increase AROM c/s EXT to 50 degs, right SB to 20 degs, left SB to WNL, right rotation to WNL, left rotation to 60 degs to improve ability to move her head while driving. Eval: EXT 42 degs, right SB 13 degs, left SB 20 degs, right rotation 58 degs, left rotation 50 degs (all with pulling sensations, and increased c/s flex compensation noted with B SB AROM)  Current status: progressing, ext 48 deg, Right S/B 39 deg, Left S/B 22 deg, Rotation Right 65 deg, Rotation Left 36 deg  4. Pt will report being able to ambulate 3 blocks around her home with minimal to no increased LBP or right LE/hip pain to improve ability to perform recreational activities.    Eval: reports ambulating 3 blocks around her home recently and had increased pain afterwards and needed to sit down because of pain.   Current status: met - Pt able to amb through [facility] without pain or difficulty    PLAN  [x]  Upgrade activities as tolerated     [x]  Continue plan of care  []  Update interventions per flow sheet       []  Discharge due to:_  []  Other:_      Anand Elias, PT 3/7/2019  7:09 AM    Future Appointments   Date Time Provider José Jarvis   3/7/2019  8:15 AM Jada Juarez, PT [facility]   3/12/2019  9:00 AM Shakira Welch, PT [facility]

## 2019-03-12 ENCOUNTER — HOSPITAL ENCOUNTER (OUTPATIENT)
Dept: PHYSICAL THERAPY | Age: 79
Discharge: HOME | End: 2019-03-12
Payer: MEDICARE

## 2019-03-12 PROCEDURE — 97110 THERAPEUTIC EXERCISES: CPT

## 2019-03-12 PROCEDURE — 97112 NEUROMUSCULAR REEDUCATION: CPT

## 2019-03-12 NOTE — PROGRESS NOTES
PT DAILY TREATMENT NOTE 10-18    Patient Name: Glen Alvarado  Date:3/12/2019  : 1940  [x]  Patient  Verified  Payor: [place] MEDICARE / Plan: [place] MEDICARE PART A & B / Product Type: Medicare /    In time: 9:01    Out time: 9:51  Total Treatment Time (min): 50  Visit #: 10 of 10    Medicare/BCBS Only   Total Timed Codes (min):  50 1:1 Treatment Time: 50       Treatment Area: Low back pain [M54.5]  Cervicalgia [M54.2]    SUBJECTIVE  Pain Level (0-10 scale): 0  Any medication changes, allergies to medications, adverse drug reactions, diagnosis change, or new procedure performed?: [x] No    [] Yes (see summary sheet for update)  Subjective functional status/changes:   [] No changes reported  Pt reports she went to the ED on Saturday because her heart was racing and throbbing. Pt reports that they did tests and did not see anything abnormal. Pt also reported having increased pain with this as well. Pt states she follows up with the MD on 3/22/2019 regarding her heart rate. Pt denies any cardiac problems currently. OBJECTIVE    12 min Therapeutic Exercise:  [x] See flow sheet :   Rationale: increase ROM and increase strength to improve the patients ability to perform ADLs     38 min Neuromuscular Re-education:  [x]  See flow sheet :   Rationale: increase strength, improve coordination and increase proprioception  to improve the patients ability to improve tolerance to ADLs    With   [] TE   [] TA   [] neuro   [] other: Patient Education: [x] Review HEP    [] Progressed/Changed HEP based on:   [] positioning   [] body mechanics   [] transfers   [] heat/ice application    [] other:      Other Objective/Functional Measures: Increased reps per flow sheet, added blue tband walkouts, added deadbugs with green tband, SB bridges to improve strength and stability.       Pain Level (0-10 scale) post treatment: 0 discomfort in the right hip region     ASSESSMENT/Changes in Function: Reported no pain post session in the low back, only discomfort in the right hip region. Pt reported having this discomfort with the clams on the right LE as well. Educated pt to let the therapist know if any of the exercises increase her cardiac symptoms and to perform all exercises to tolerance. Pt denied any cardiac symptoms with exercises today. Continue POC as tolerated. Patient will continue to benefit from skilled PT services to modify and progress therapeutic interventions, address functional mobility deficits, address ROM deficits, address strength deficits, analyze and address soft tissue restrictions, analyze and cue movement patterns, analyze and modify body mechanics/ergonomics, assess and modify postural abnormalities, address imbalance/dizziness and instruct in home and community integration to attain remaining goals. []  See Plan of Care  []  See progress note/recertification  []  See Discharge Summary         Progress towards goals / Updated goals:  Goals for this certification period to be accomplished in 10 treatments:  Goal: Patient will increase FOTO score 56 pts to improve ability to perform ADLs. Status at last note/certification: 51 pts  Goal: Patient will improve cervical Left rotation and lateral flexion by at least 10 deg in order to improve ease of driving, turning head. Status at last note/certification: Left lateral flexion 22 deg, Rotation Left 36 deg  Goal: Patient will report ability to consistent ambulate community distances without AD in order to indicate improved confidence and stability with mobility. Status at last note/certification: was walking without AD for about 10 days but recent reliance on cane due to instability  Goal: Patient will report overall at least 60% improvement in function in order to progress toward personal goals.   Status at last note/certification: 24%    PLAN  [x]  Upgrade activities as tolerated     [x]  Continue plan of care  [x]  Update interventions per flow sheet       [] Discharge due to:_  []  Other:_      Deann Coughlin, PT 3/12/2019  9:07 AM    Future Appointments   Date Time Provider José Jarvis   3/15/2019  8:15 AM Jada Juarez, PT [facility]   3/20/2019  9:00 AM Jada Juarez, PT [facility]   3/22/2019  8:15 AM Shakira Welch, PT [facility]   3/25/2019  8:15 AM Nabil Walters, PT [facility]   3/27/2019  9:00 AM Jada Juarez, PT [facility]

## 2019-03-15 ENCOUNTER — HOSPITAL ENCOUNTER (OUTPATIENT)
Dept: PHYSICAL THERAPY | Age: 79
Discharge: HOME | End: 2019-03-15
Payer: MEDICARE

## 2019-03-15 PROCEDURE — 97112 NEUROMUSCULAR REEDUCATION: CPT

## 2019-03-15 NOTE — PROGRESS NOTES
PT DAILY TREATMENT NOTE 10-18    Patient Name: Steph Beebe  Date:3/15/2019  : 1940  [x]  Patient  Verified  Payor: [place] MEDICARE / Plan: [place] MEDICARE PART A & B / Product Type: Medicare /    In time:812  Out time:901  Total Treatment Time (min): 49  Visit #: 1 of 10    Medicare/BCBS Only   Total Timed Codes (min):  49 1:1 Treatment Time:  34       Treatment Area: Low back pain [M54.5]  Cervicalgia [M54.2]    SUBJECTIVE  Pain Level (0-10 scale): 0  Any medication changes, allergies to medications, adverse drug reactions, diagnosis change, or new procedure performed?: [x] No    [] Yes (see summary sheet for update)  Subjective functional status/changes:   [] No changes reported  If I'm just standing, fine, but I never know when I take a step if it will hurt.      OBJECTIVE    Modality rationale: Patient declined   Type Additional Details   [] Estim:  []Unatt       []IFC  []Premod                        []Other:  []w/ice   []w/heat  Position:  Location:   [] Estim: []Att    []TENS instruct  []NMES                    []Other:  []w/US   []w/ice   []w/heat  Position:  Location:   []  Traction: [] Cervical       []Lumbar                       [] Prone          []Supine                       []Intermittent   []Continuous Lbs:  [] before manual  [] after manual   []  Ultrasound: []Continuous   [] Pulsed                           []1MHz   []3MHz W/cm2:  Location:   []  Iontophoresis with dexamethasone         Location: [] Take home patch   [] In clinic   []  Ice     []  heat  []  Ice massage  []  Laser   []  Anodyne Position:  Location:   []  Laser with stim  []  Other:  Position:  Location:   []  Vasopneumatic Device Pressure:       [] lo [] med [] hi   Temperature: [] lo [] med [] hi   [] Skin assessment post-treatment:  []intact []redness- no adverse reaction    []redness  adverse reaction:     10 min Therapeutic Exercise:  [x] See flow sheet :   Rationale: increase ROM and increase strength to improve the patients ability to perform ADL    39 min Neuromuscular Re-education:  [x]  See flow sheet :   Rationale: increase strength, improve coordination and increase proprioception  to improve the patients ability to improve overall stability and core activation in order to reduce strain with daily tasks    With   [] TE   [] TA   [] neuro   [] other: Patient Education: [x] Review HEP    [] Progressed/Changed HEP based on:   [] positioning   [] body mechanics   [] transfers   [] heat/ice application    [] other:      Other Objective/Functional Measures: increased resistance standing Chair pumps without pain     Pain Level (0-10 scale) post treatment: 0    ASSESSMENT/Changes in Function: Patient reports that her biggest concern is intermittent periods of Right LE pain leading to leg giving way. Upon strength assessment Right hip abduction is 4/5. Updated HEP to include sidelying hip abduction. Patient education on gait deviations that can occur with progressed hip weakness. Patient will continue to benefit from skilled PT services to modify and progress therapeutic interventions, address functional mobility deficits, address ROM deficits, address strength deficits, analyze and address soft tissue restrictions, analyze and cue movement patterns, analyze and modify body mechanics/ergonomics, assess and modify postural abnormalities, address imbalance/dizziness and instruct in home and community integration to attain remaining goals. []  See Plan of Care  []  See progress note/recertification  []  See Discharge Summary         Progress towards goals / Updated goals:  Goals for this certification period to be accomplished in 10 treatments:  Goal: Patient will increase FOTO score 56 pts to improve ability to perform ADLs.   Status at last note/certification: 51 pts  Current status: reassess at MD note  Goal: Patient will improve cervical Left rotation and lateral flexion by at least 10 deg in order to improve ease of driving, turning head.   Status at last note/certification: Left lateral flexion 22 deg, Rotation Left 36 deg  Current status: reassess next week  Goal: Patient will report ability to consistent ambulate community distances without AD in order to indicate improved confidence and stability with mobility. Status at last note/certification: was walking without AD for about 10 days but recent reliance on cane due to instability  Current status: progressing, continues to use cane  Goal: Patient will report overall at least 60% improvement in function in order to progress toward personal goals.   Status at last note/certification: 09%  Current status: reassess at MD note    PLAN  [x]  Upgrade activities as tolerated     [x]  Continue plan of care  []  Update interventions per flow sheet       []  Discharge due to:_  []  Other:_      Sully Acosta PT 3/15/2019  7:11 AM    Future Appointments   Date Time Provider José Jarvis   3/15/2019  8:15 AM Malu Montesinos, PT [facility]   3/20/2019  9:00 AM Malu Montesinos, PT [facility]   3/22/2019  8:15 AM Jose Levin, PT [facility]   3/25/2019  8:15 AM Nisreen Walters, PT [facility]   3/27/2019  9:00 AM Malu Montesinos, PT [facility]

## 2019-03-20 ENCOUNTER — HOSPITAL ENCOUNTER (OUTPATIENT)
Dept: PHYSICAL THERAPY | Age: 79
Discharge: HOME | End: 2019-03-20
Payer: MEDICARE

## 2019-03-20 PROCEDURE — 97110 THERAPEUTIC EXERCISES: CPT

## 2019-03-20 PROCEDURE — 97112 NEUROMUSCULAR REEDUCATION: CPT
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
## 2019-03-22 ENCOUNTER — HOSPITAL ENCOUNTER (OUTPATIENT)
Dept: PHYSICAL THERAPY | Age: 79
Discharge: HOME | End: 2019-03-22
Payer: MEDICARE

## 2019-03-22 PROCEDURE — 97112 NEUROMUSCULAR REEDUCATION: CPT

## 2019-03-22 PROCEDURE — 97110 THERAPEUTIC EXERCISES: CPT

## 2019-03-22 NOTE — PROGRESS NOTES
PT DAILY TREATMENT NOTE 10-18 Patient Name: Constance Kingsley Date:3/22/2019 : 1940 [x]  Patient  Verified Payor: [place] / Plan: Christin Oates / Product Type: Medicare / In time: 8:15    Out time: 9:00 Total Treatment Time (min): 45 Visit #: 3 of 10 Medicare/BCBS Only Total Timed Codes (min): 45 1:1 Treatment Time: 40 Treatment Area: Low back pain [M54.5] Cervicalgia [M54.2] SUBJECTIVE Pain Level (0-10 scale): 1 Any medication changes, allergies to medications, adverse drug reactions, diagnosis change, or new procedure performed?: [x] No    [] Yes (see summary sheet for update) Subjective functional status/changes:   [] No changes reported Pt reports that her right hip still gives out at times but it is better than it has been overall. OBJECTIVE 10 min Therapeutic Exercise:  [x] See flow sheet :  
Rationale: increase ROM and increase strength to improve the patients ability to perform ADLs 35 min Neuromuscular Re-education:  [x]  See flow sheet :  
Rationale: increase strength, improve coordination and increase proprioception  to improve the patients ability to improve overall postural stability and awareness, core strength in order to reduce strain with ADLs With 
 [] TE 
 [] TA 
 [] neuro 
 [] other: Patient Education: [x] Review HEP [] Progressed/Changed HEP based on:  
[] positioning   [] body mechanics   [] transfers   [] heat/ice application   
[] other:   
 
Other Objective/Functional Measures: Added blue tband to deadbug to improve strength in the core and LEs. Needs cueing to avoid trunk flex with UTR to improve core/trunk stability. Needed cueing to avoid excessive trunk rotation. Pain Level (0-10 scale) post treatment: 0 
 
ASSESSMENT/Changes in Function: Reported no pain post session in standing.  Pt reported having pain with right s/l clams ER but not with IR and limited ER AROM noted on the right with this exercise. Challenged with balance and stability with B stork pose. Continue POC as tolerated. Patient will continue to benefit from skilled PT services to modify and progress therapeutic interventions, address functional mobility deficits, address ROM deficits, address strength deficits, analyze and address soft tissue restrictions, analyze and cue movement patterns, analyze and modify body mechanics/ergonomics, assess and modify postural abnormalities, address imbalance/dizziness and instruct in home and community integration to attain remaining goals. []  See Plan of Care 
[]  See progress note/recertification 
[]  See Discharge Summary Progress towards goals / Updated goals: 
Goals for this certification period to be accomplished in 10 treatments: 
Goal: Patient will increase FOTO score 56 pts to improve ability to perform ADLs. Status at last note/certification: 51 pts Current status: reassess at MD note Goal: Patient will improve cervical Left rotation and lateral flexion by at least 10 deg in order to improve ease of driving, turning head.  
Status at last note/certification: Left lateral flexion 22 deg, Rotation Left 36 deg Current status: reassess in upcoming visits. Goal: Patient will report ability to consistent ambulate community distances without AD in order to indicate improved confidence and stability with mobility. Status at last note/certification: was walking without AD for about 10 days but recent reliance on cane due to instability Current status: progressing, no cane today but states her right hip gives out at times but it is better than it has been. Goal: Patient will report overall at least 60% improvement in function in order to progress toward personal goals. Status at last note/certification: 21% Current status: reassess at MD note PLAN [x]  Upgrade activities as tolerated     [x]  Continue plan of care [x]  Update interventions per flow sheet      
[]  Discharge due to:_ 
[]  Other:_ Silvestre Vigil, PT 3/22/2019  8:17 AM 
 
Future Appointments Date Time Provider José Jarvis 3/25/2019  8:15 AM Mandie Walters, PT [id number]  
3/27/2019  9:00 AM Jonathan Rizo, PT [facility]

## 2019-03-25 ENCOUNTER — HOSPITAL ENCOUNTER (OUTPATIENT)
Dept: PHYSICAL THERAPY | Age: 79
Discharge: HOME | End: 2019-03-25
Payer: MEDICARE

## 2019-03-25 PROCEDURE — 97112 NEUROMUSCULAR REEDUCATION: CPT

## 2019-03-25 PROCEDURE — 97110 THERAPEUTIC EXERCISES: CPT

## 2019-03-25 NOTE — PROGRESS NOTES
PT DAILY TREATMENT NOTE 10-18 Patient Name: Curtis Eddy Date:3/25/2019 : 1940 [x]  Patient  Verified Payor: [place] MEDICARE / Plan: Christin Oatesmesha / Product Type: Medicare / In time: 8:17    Out time: 9:10 Total Treatment Time (min): 53 Visit #: 4 of 10 Medicare/BCBS Only Total Timed Codes (min): 53 1:1 Treatment Time: 48 Treatment Area: Low back pain [M54.5] Cervicalgia [M54.2] SUBJECTIVE Pain Level (0-10 scale): 0/10 Any medication changes, allergies to medications, adverse drug reactions, diagnosis change, or new procedure performed?: [x] No    [] Yes (see summary sheet for update) Subjective functional status/changes:   [] No changes reported \"I feel good. I have no pain. My weekend was good. \" OBJECTIVE 10 min Therapeutic Exercise:  [x] See flow sheet :  
Rationale: increase ROM and increase strength to improve the patients ability to perform ADLs 43 min Neuromuscular Re-education:  [x]  See flow sheet :  
Rationale: increase strength, improve coordination and increase proprioception  to improve the patients ability to improve overall postural stability and awareness, core strength in order to reduce strain with ADLs With 
 [] TE 
 [] TA 
 [] neuro 
 [] other: Patient Education: [x] Review HEP [] Progressed/Changed HEP based on:  
[] positioning   [] body mechanics   [] transfers   [] heat/ice application   
[] other:   
 
Other Objective/Functional Measures:  Continued with Rx program per flow sheet. Given verbal cueing for proper technique of exercises. Pain Level (0-10 scale) post treatment: 0/10 ASSESSMENT/Changes in Function: Pt exhibits improving core and hip strength. Pt continues to report buckling and mild pain in right hip every 3 days, but an improvement from pain and popping upon standing throughout each day upon standing.  
 
Patient will continue to benefit from skilled PT services to modify and progress therapeutic interventions, address functional mobility deficits, address ROM deficits, address strength deficits, analyze and address soft tissue restrictions, analyze and cue movement patterns, analyze and modify body mechanics/ergonomics, assess and modify postural abnormalities, address imbalance/dizziness and instruct in home and community integration to attain remaining goals. []  See Plan of Care 
[]  See progress note/recertification 
[]  See Discharge Summary Progress towards goals / Updated goals: 
Goals for this certification period to be accomplished in 10 treatments: 
Goal: Patient will increase FOTO score 56 pts to improve ability to perform ADLs. Status at last note/certification: 51 pts Current status: reassess at MD note Goal: Patient will improve cervical Left rotation and lateral flexion by at least 10 deg in order to improve ease of driving, turning head.  
Status at last note/certification: Left lateral flexion 22 deg, Rotation Left 36 deg Current status: reassess in upcoming visits. Goal: Patient will report ability to consistent ambulate community distances without AD in order to indicate improved confidence and stability with mobility. Status at last note/certification: was walking without AD for about 10 days but recent reliance on cane due to instability Current status: progressing - no A.D usually but has SPC in car just in case; right hip maryanne now every 3 days Goal: Patient will report overall at least 60% improvement in function in order to progress toward personal goals. Status at last note/certification: 83% Current status: reassess at MD note PLAN [x]  Upgrade activities as tolerated     [x]  Continue plan of care [x]  Update interventions per flow sheet      
[]  Discharge due to:_ 
[]  Other:_   
 
Niru Walters, PT 3/25/2019  8:17 AM 
 
Future Appointments Date Time Provider José Jarvis 3/27/2019  9:00 AM Rory May, [facility]  
4/1/2019  8:15 AM Charly Walters, [facility]  
4/3/2019  8:15 AM Rory May, [facility]

## 2019-03-27 ENCOUNTER — HOSPITAL ENCOUNTER (OUTPATIENT)
Dept: PHYSICAL THERAPY | Age: 79
Discharge: HOME | End: 2019-03-27
Payer: MEDICARE

## 2019-03-27 PROCEDURE — 97112 NEUROMUSCULAR REEDUCATION: CPT

## 2019-03-27 PROCEDURE — 97110 THERAPEUTIC EXERCISES: CPT

## 2019-04-01 ENCOUNTER — HOSPITAL ENCOUNTER (OUTPATIENT)
Dept: PHYSICAL THERAPY | Age: 79
Discharge: HOME | End: 2019-04-01
Payer: MEDICARE

## 2019-04-01 PROCEDURE — 97112 NEUROMUSCULAR REEDUCATION: CPT

## 2019-04-01 PROCEDURE — 97110 THERAPEUTIC EXERCISES: CPT

## 2019-04-01 NOTE — PROGRESS NOTES
PT DAILY TREATMENT NOTE 10-18 Patient Name: Ilsa Rainey Date:2019 : 1940 [x]  Patient  Verified Payor: [place] MEDICARE / Plan: Christin Schneider y / Product Type: Medicare / In time:8:20  Out time:9:04 Total Treatment Time (min): 44 Visit #: 6 of 10 Medicare/BCBS Only Total Timed Codes (min):  44 1:1 Treatment Time:  44 Treatment Area: Low back pain [M54.5] Cervicalgia [M54.2] SUBJECTIVE Pain Level (0-10 scale): 0/10 Any medication changes, allergies to medications, adverse drug reactions, diagnosis change, or new procedure performed?: [x] No    [] Yes (see summary sheet for update) Subjective functional status/changes:   [] No changes reported \"I have lymphedema in my right arm so I took it easy last time as some of the exercises aggravated it. Today, I'd like to still take it easy, maybe not go as far with the exercises. Otherwise, the hip feels okay, other than the occasional catching in the hip. \" OBJECTIVE 10 min Therapeutic Exercise:  [x] See flow sheet :  
Rationale: increase ROM and increase strength to improve the patients ability to perform ADLs 29 min Neuromuscular Re-education:  [x]  See flow sheet :  
Rationale: increase strength, improve coordination, improve balance and increase proprioception  to improve the patients ability to improve core and hip strength in order to reduce strain with mobility, improve postural awareness and stability with daily tasks With 
 [] TE 
 [] TA 
 [] neuro 
 [] other: Patient Education: [x] Review HEP [] Progressed/Changed HEP based on:  
[] positioning   [] body mechanics   [] transfers   [] heat/ice application   
[] other:   
 
Other Objective/Functional Measures: Continued with Rx program per flow sheet. Held stork pose to avoid aggravation of right arm symptoms. Pain Level (0-10 scale) post treatment: 0/10 ASSESSMENT/Changes in Function:  Pt has progressed steadily with skilled therapy measures. Pt reports 85% improvement in function since start of PT and notes only issue lingering at this point is occasional catching in right hip upon standing from sitting and starting to walk. Pt is compliant with HEP and is appropriate to finish out next visit on 4/3/19 and then D/C to updated HEP to continue to self-manage care. Patient will continue to benefit from skilled PT services to modify and progress therapeutic interventions, address functional mobility deficits, address ROM deficits, address strength deficits, analyze and address soft tissue restrictions, analyze and cue movement patterns, analyze and modify body mechanics/ergonomics, assess and modify postural abnormalities, address imbalance/dizziness and instruct in home and community integration to attain remaining goals. []  See Plan of Care 
[]  See progress note/recertification 
[]  See Discharge Summary Progress towards goals / Updated goals: 
Goals for this certification period to be accomplished in 10 treatments: 
Goal: Patient will increase FOTO score 56 pts to improve ability to perform ADLs. Status at last note/certification: 51 pts Current status: reassess next visit Goal: Patient will improve cervical Left rotation and lateral flexion by at least 10 deg in order to improve ease of driving, turning head.  
Status at last note/certification: Left lateral flexion 22 deg, Rotation Left 36 deg Current status: progressing, Left lateral flexion 22 deg, Left rotation 39 deg Goal: Patient will report ability to consistent ambulate community distances without AD in order to indicate improved confidence and stability with mobility. Status at last note/certification: was walking without AD for about 10 days but recent reliance on cane due to instability Current status: progressing, has been able to walk without AD recently Goal: Patient will report overall at least 60% improvement in function in order to progress toward personal goals. Status at last note/certification: 30% Current status: met - 85% improved since start of PT 
 
PLAN [x]  Upgrade activities as tolerated     [x]  Continue plan of care 
[]  Update interventions per flow sheet      
[]  Discharge due to:_ 
[x]  Other:_FOTO and D/C next visit Rigo Walters, PT 4/1/2019  7:10 AM 
 
Future Appointments Date Time Provider José Jarvis 4/3/2019  8:15 AM Damon George, PT [facility]

## 2019-04-03 ENCOUNTER — APPOINTMENT (OUTPATIENT)
Dept: PHYSICAL THERAPY | Age: 79
End: 2019-04-03
Payer: MEDICARE

## 2019-04-05 ENCOUNTER — HOSPITAL ENCOUNTER (OUTPATIENT)
Dept: PHYSICAL THERAPY | Age: 79
Discharge: HOME | End: 2019-04-05
Payer: MEDICARE

## 2019-04-05 PROCEDURE — 97112 NEUROMUSCULAR REEDUCATION: CPT

## 2019-04-05 NOTE — PROGRESS NOTES
PT DISCHARGE DAILY NOTE AND [id number] Date:2019 Patient name: [facility] of Care: 2019 Referral source: Tano Morales DO : 1940              
Medical Diagnosis: Low back pain [M54.5] Cervicalgia [M54.2] 
  Onset Date: Most recent 2018              
Treatment Diagnosis: LBP, right hip pain, neck pain Prior Hospitalization: see medical history Provider#: [id number] Medications: Verified on Patient summary List  
 Comorbidities: heart disease, osteoporosis, thyroid problems, hx breast cancer , left TKR  
 Prior Level of Function: Independent with ADls, functional, and recreational activities with on/off LBP but was able to lift and perform these activities without assistance.  
 
Visits from Start of Care: 17    Missed Visits: 0 Reporting Period : 19 to 19 [x]  Patient  Verified Payor: [place] MEDICARE / Plan: [employer] / Product Type: Medicare / In time:409  Out time:427 Total Treatment Time (min): 18 Total Timed Codes (min): 18 
1:1 Treatment Time ( only): 14 Visit #: 7 of 10 SUBJECTIVE Pain Level (0-10 scale): 1 Any medication changes, allergies to medications, adverse drug reactions, diagnosis change, or new procedure performed?: [x] No    [] Yes (see summary sheet for update) Subjective functional status/changes:   [] No changes reported It's just some discomfort that I think is just going to be there. OBJECTIVE Modality rationale:   
Min Type Additional Details  
 [] Estim:  []Unatt       []IFC  []Premod []Other:  []w/ice   []w/heat Position: Location:  
 [] Estim: []Att    []TENS instruct  []NMES []Other:  []w/US   []w/ice   []w/heat Position: Location:  
 []  Traction: [] Cervical       []Lumbar 
                     [] Prone          []Supine                      []Intermittent   []Continuous Lbs: 
[] before manual 
[] after manual  
 []  Ultrasound: []Continuous   [] Pulsed []1MHz   []3MHz W/cm2: 
Location:  
 []  Iontophoresis with dexamethasone Location: [] Take home patch  
[] In clinic  
 []  Ice     []  heat 
[]  Ice massage 
[]  Laser  
[]  Anodyne Position: Location:  
 []  Laser with stim 
[]  Other:  Position: Location:  
 []  Vasopneumatic Device Pressure:       [] lo [] med [] hi  
Temperature: [] lo [] med [] hi  
[] Skin assessment post-treatment:  []intact []redness- no adverse reaction 
  []redness  adverse reaction:  
 
18 min Neuromuscular Re-education:  [x]  See flow sheet :  
Rationale: increase strength, improve coordination, improve balance and increase proprioception  to improve the patients ability to improve core and hip strength in order to reduce strain with daily tasks With 
 [] TE 
 [] TA 
 [] neuro 
 [] other: Patient Education: [x] Review HEP [] Progressed/Changed HEP based on:  
[] positioning   [] body mechanics   [] transfers   [] heat/ice application   
[] other:   
 
Other Objective/Functional Measures: reviewed/updated HEP Pain Level (0-10 scale) post treatment: 0 Summary of Care: 
Goals for this certification period to be accomplished in 10 treatments: 
Goal: Patient will increase FOTO score 56 pts to improve ability to perform ADLs. Status at last note/certification: 51 pts Current status: progressing, 53 pts Goal: Patient will improve cervical Left rotation and lateral flexion by at least 10 deg in order to improve ease of driving, turning head.  
Status at last note/certification: Left lateral flexion 22 deg, Rotation Left 36 deg Current status: progressing, Left lateral flexion remains around 22 deg, Left rotation 40 deg Goal: Patient will report ability to consistent ambulate community distances without AD in order to indicate improved confidence and stability with mobility.  
Status at last note/certification: was walking without AD for about 10 days but recent reliance on cane due to instability Current status: met Goal: Patient will report overall at least 60% improvement in function in order to progress toward personal goals. Status at last note/certification: 89% Current status: met - 85% improved since start of PT 
 
ASSESSMENT/Changes in Function: Patient has consistently attended therapy for low back and hip pain, with good improvement in overall stability with mobility and activity tolerance. Good reduction in pain recently. While Patient does continue to have some hip discomfort with weight bearing, she is independent with routine and ready to transition to HEP. Should she require further treatment in the future we would be pleased to treat her. Thank you for this referral! 
  
PLAN [x]Discontinue therapy: [x]Patient has reached or is progressing toward set goals []Patient is non-compliant or has abdicated 
    []Due to lack of appreciable progress towards set goals Kathia Ospina, PT 4/5/2019  12:34 PM

## 2022-07-18 ENCOUNTER — HOSPITAL ENCOUNTER (OUTPATIENT)
Dept: PHYSICAL THERAPY | Age: 82
Discharge: HOME | End: 2022-07-18
Payer: MEDICARE

## 2022-07-18 PROCEDURE — 97530 THERAPEUTIC ACTIVITIES: CPT

## 2022-07-18 PROCEDURE — 97162 PT EVAL MOD COMPLEX 30 MIN: CPT

## 2022-07-18 NOTE — PROGRESS NOTES
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 Pt had HHPT x 2 wks. No outpatient PT. Current Symptoms/Deficits: Pain along low back, right posterior hip; constant in nature, worse with standing/amb - limited to one aisle in store or < 5 minutes; disrupted sleep - tosses/turns. Limited to sitting prolonged periods from pain. Pain-  Current: 4/10     Worst: 7/10   Best: 4/10  Previous Treatment/Compliance: HEP from HHPT - non-compliant  Mobility Devices: SPC in home, community  PMHx/Surgical Hx: right SEBAS 22; hx L/S stenosis x several yrs  Work Hx: N/A  Living Situation: 1-story home with spouse; does laundry; spouse cooks main meals of the day; 3 steps into garage for laundry - step to pattern  Household Modifications: none  Hobbies: nothing specific  Pt Goals: \"Pain relief; be able to get a good night's sleep. \"    OBJECTIVE    17 min [x]Eval                  []Re-Eval     15 min Therapeutic Activity:  []  See flow sheet : Patient education on therapy assessment, prognosis, expectations for therapy sessions, patient goals, and HEP. Rationale: to improve the patients ability to adhere to HEP and therapy sessions for increased compliance when working toward therapy goals. 8 min Manual Therapy:  MET leg pull to correct left ilial upslip   The manual therapy interventions were performed at a separate and distinct time from the therapeutic activities interventions.   Rationale: decrease pain and increase ROM to normalize gait pattern and reduce low back, right hip pain    With   [] TE   [x] TA   [] neuro   [] other: Patient Education: [x] Review HEP    [] Progressed/Changed HEP based on:   [] positioning   [] body mechanics   [] transfers   [] heat/ice application    [] other:      Other Objective/Functional Measures: FOTO 40 pts    Observation: well-healed incision to right posterolateral hip  Palpation: TTP at right hip flexors, glute medius, TFL/ITB    Lumbar AROM:                                           AROM (% of full)                 Right Left Effect   Flexion 75%    Extension WNL    Side Bend WNL WNL    Rotation WNL WNL                                               -  Strength:   Right (/5) Left (/5)   Hip     Flexion 4- 5             Abduction 3+ 5             Adduction 3+ 5             Extension 3+ 5             ER 3+ 5             IR 3+ 5   Knee   Extension 5 5              Flexion 5 5   Ankle   Dorsiflexion 5 5               PF 5  5                Inversion 5 5               Eversion 5 5     Sit to Stand test: 6 x in 30 seconds without UE support    Gait: antalgic limp right LE    Functional Squat: increased left LE WB    Stair Negotiation: step to pattern with UE support    Balance: Romberg EC - increased sway, LOB; MSR EC - WNL; SLS 6 seconds left; 4 seconds right    Reflexes/Sensation: intact to light touch    Alignment: left ilial upslip - corrected with MET leg pull    Special Tests:      Right Left   Slump       SLR     Piriformis     -   Right Left   Hamstring 90/90       Radha Covarrubias + Natasha Tristan       -   Right Left   JOBY       FADDIR     Hip Scour     -    Pain Level (0-10 scale) post treatment: 4/10    ASSESSMENT/Changes in Function: See POC    Patient will continue to benefit from skilled PT services to modify and progress therapeutic interventions, address functional mobility deficits, address ROM deficits, address strength deficits, analyze and address soft tissue restrictions, analyze and cue movement patterns, analyze and modify body mechanics/ergonomics, assess and modify postural abnormalities, address imbalance/dizziness and instruct in home and community integration to attain remaining goals. [x]  See Plan of Care  []  See progress note/recertification  []  See Discharge Summary         Progress towards goals / Updated goals:  See POC    PLAN  [x]  Upgrade activities as tolerated     []  Continue plan of care  [x]  Update interventions per flow sheet       []  Discharge due to:_  []  Other:_      Alfonso Walters, PT 7/18/2022  10:43 AM    No future appointments.

## 2022-07-18 NOTE — PROGRESS NOTES
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 Assessment/ key information: Pt is a 80 y.o. female who presents with c/o right posterolateral hip pain that radiates into low back that has continued to persist since right SEBAS on 22. Functional deficits include: constant pain, limited standing/amb tolerance of one aisle in store or < 5 minutes, inability to prepare full meals, step to pattern on stairs with use of handrail, need for SPC in home, community, and disrupted sleep - tosses/turns from pain. Upon exam, Pt exhibited right hip flexion AROM of 85 deg, right hip strength of 3+ to 4-/5 grossly, impaired functional LE strength, pelvic obliquity affecting gait pattern, and impaired standing balance. Pt would benefit from skilled PT to address above deficits to improve Pt's function and ability to return to more active lifestyle with less pain and increased safety for reduced falls risk.     Evaluation Complexity History MEDIUM  Complexity : 1-2 comorbidities / personal factors will impact the outcome/ POC ; Examination MEDIUM Complexity : 3 Standardized tests and measures addressing body structure, function, activity limitation and / or participation in recreation  ;Presentation MEDIUM Complexity : Evolving with changing characteristics  ; Clinical Decision Making MEDIUM Complexity : FOTO score of 26-74  Overall Complexity Rating: MEDIUM  Problem List: pain affecting function, decrease ROM, decrease strength, edema affecting function, impaired gait/ balance, decrease ADL/ functional abilitiies, decrease activity tolerance, decrease flexibility/ joint mobility and decrease transfer abilities   Treatment Plan may include any combination of the following: Therapeutic exercise, Therapeutic activities, Neuromuscular re-education, Physical agent/modality, Gait/balance training, Manual therapy, Patient education, Functional mobility training and Stair training  Patient / Family readiness to learn indicated by: asking questions, trying to perform skills and interest  Persons(s) to be included in education: patient (P)  Barriers to Learning/Limitations: None  Patient Goal (s): Pain relief; be able to get a good night's sleep.   Patient Self Reported Health Status: good  Rehabilitation Potential: good    Short Term Goals: To be accomplished in 1 weeks:  Goal: Pt to be compliant with initial HEP to improve hip strength for ease of transfers, stair negotiation. Status at last note/certification: Established and reviewed with Pt  Long Term Goals: To be accomplished in 5 weeks:  Goal: Pt to increase right hip flexion AROM to 95 deg without increased pain for ease of getting in/out of vehicle, dressing. Status at last note/certification: hip flexion 85 deg with pain  Goal: Pt to increase right hip strength to 4+/5 grossly to negotiate stairs with reciprocal pattern for ease navigating in home.   Status at last note/certification: hip flex 4-/5, hip ABD/ADD 3+/5, hip Ext 3+/5, hip ER/IR 3+/5  Goal: Pt to ambulate household/community distances without gait deviations or A.D for increased independence. Status at last note/certification: SPC in home, community  Goal: Pt to report < 3/10 pain at worst to be able to sleep through the night comfortably. Status at last note/certification: 1/12 pain at worst  Goal: Pt to report FOTO score of 58 pts to show improved function and quality of life. Status at last note/certification: FOTO 40 pts     Frequency / Duration: Patient to be seen 2 times per week for 5 weeks. Patient/ Caregiver education and instruction: Diagnosis, prognosis, exercises   [x]  Plan of care has been reviewed with PTA    Certification Period: 7/18/22 - 8/16/22  Aurea Walters, PT 7/18/2022 11:49 AM  _____________________________________________________________________  I certify that the above Therapy Services are being furnished while the patient is under my care. I agree with the treatment plan and certify that this therapy is necessary.     [address] [place] [address] Signature:____________Date:_________TIME:________     Raul Gil MD  ** Signature, Date and Time must be completed for valid certification **    Please sign and return to [facility] - [address]  [phone] fax

## 2022-07-20 ENCOUNTER — HOSPITAL ENCOUNTER (OUTPATIENT)
Dept: PHYSICAL THERAPY | Age: 82
Discharge: HOME | End: 2022-07-20
Payer: MEDICARE

## 2022-07-20 PROCEDURE — 97140 MANUAL THERAPY 1/> REGIONS: CPT

## 2022-07-20 PROCEDURE — 97110 THERAPEUTIC EXERCISES: CPT

## 2022-07-20 NOTE — PROGRESS NOTES
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 Pt reported she has tried some of the exercises except the ones on the floor. OBJECTIVE    25 min Therapeutic Exercise:  [] See flow sheet :   Rationale: increase ROM, increase strength, improve coordination, improve balance, and increase proprioception to improve the patients ability to tolerate functional mobility and daily routine. 15 min Manual Therapy:  STM/TPR to right hip flexor and proximal quadriceps, Scar tissue mobilizations    The manual therapy interventions were performed at a separate and distinct time from the therapeutic activities interventions.   Rationale: decrease pain, increase ROM, increase tissue extensibility, decrease trigger points, and increase postural awareness to address functional mobility and daily routine          With   [] TE   [] TA   [] neuro   [] other: Patient Education: [x] Review HEP    [] Progressed/Changed HEP based on:   [] positioning   [] body mechanics   [] transfers   [] heat/ice application    [] other:      Other Objective/Functional Measures: established exercise program     Pain Level (0-10 scale) post treatment: 5/10    ASSESSMENT/Changes in Function: Pt seen by PT to address right hip pain, strength and functional mobility for initial session following evaluation. Pt with good tolerance of the session with minimal to no lasting increase in pain or discomfort. Pt challenged with hip abduction on the right side secondary to muscle weakness. PT provided intermittent verbal/tactile cues for optimal form and alignment. Patient will continue to benefit from skilled PT services to modify and progress therapeutic interventions, address functional mobility deficits, address ROM deficits, address strength deficits, analyze and address soft tissue restrictions, analyze and cue movement patterns, analyze and modify body mechanics/ergonomics, assess and modify postural abnormalities, address imbalance/dizziness, and instruct in home and community integration to attain remaining goals. []  See Plan of Care  []  See progress note/recertification  []  See Discharge Summary         Progress towards goals / Updated goals:  Goal: Pt to be compliant with initial HEP to improve hip strength for ease of transfers, stair negotiation. Status at last note/certification: Established and reviewed with Pt  Long Term Goals: To be accomplished in 5 weeks:  Goal: Pt to increase right hip flexion AROM to 95 deg without increased pain for ease of getting in/out of vehicle, dressing. Status at last note/certification: hip flexion 85 deg with pain  Goal: Pt to increase right hip strength to 4+/5 grossly to negotiate stairs with reciprocal pattern for ease navigating in home. Status at last note/certification: hip flex 4-/5, hip ABD/ADD 3+/5, hip Ext 3+/5, hip ER/IR 3+/5  Goal: Pt to ambulate household/community distances without gait deviations or A.D for increased independence. Status at last note/certification: SPC in home, community  Goal: Pt to report < 3/10 pain at worst to be able to sleep through the night comfortably.   Status at last note/certification: 4/40 pain at worst  Goal: Pt to report FOTO score of 58 pts to show improved function and quality of life.   Status at last note/certification: FOTO 40 pts    PLAN  [x]  Upgrade activities as tolerated     [x]  Continue plan of care  []  Update interventions per flow sheet       []  Discharge due to:_  []  Other:_      Anai Miguel, PT 7/20/2022  8:20 AM    Future Appointments   Date Time Provider José Jarvis   7/25/2022  8:15 AM Alli Slice, PT [facility]   7/27/2022  8:15 AM Alli Slice, PT [facility]   8/1/2022  9:00 AM [address]   8/3/2022  9:00 AM [facility]   8/8/2022  9:00 AM [facility]   8/10/2022  9:00 AM Alli Slice, PT [facility]   8/15/2022  9:00 AM [facility]   8/17/2022  9:00 AM Daughtry, Gloriajean Gosselin, PT [facility]

## 2022-07-25 ENCOUNTER — HOSPITAL ENCOUNTER (OUTPATIENT)
Dept: PHYSICAL THERAPY | Age: 82
Discharge: HOME | End: 2022-07-25
Payer: MEDICARE

## 2022-07-25 PROCEDURE — 97110 THERAPEUTIC EXERCISES: CPT

## 2022-07-25 PROCEDURE — 97140 MANUAL THERAPY 1/> REGIONS: CPT

## 2022-07-25 NOTE — PROGRESS NOTES
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]  Other low back pain [M54.59]    SUBJECTIVE  Pain Level (0-10 scale): 3/10  Any medication changes, allergies to medications, adverse drug reactions, diagnosis change, or new procedure performed?: [x] No    [] Yes (see summary sheet for update)  Subjective functional status/changes:   [] No changes reported  Pt reports no change since last session     OBJECTIVE    30 min Therapeutic Exercise:  [] See flow sheet :   Rationale: increase ROM, increase strength, improve coordination, and improve balance to improve the patients ability to tolerate functional mobility    10 min Manual Therapy:  right hip flexor release, STM/TPR to proximal quadriceps, scar tissue massage   The manual therapy interventions were performed at a separate and distinct time from the therapeutic activities interventions. Rationale: decrease pain, increase ROM, increase tissue extensibility, decrease trigger points, and increase postural awareness to tolerate functional mobility and daily routine          With   [] TE   [] TA   [] neuro   [] other: Patient Education: [x] Review HEP    [] Progressed/Changed HEP based on:   [] positioning   [] body mechanics   [] transfers   [] heat/ice application    [] other:      Pain Level (0-10 scale) post treatment: 3/10    ASSESSMENT/Changes in Function: Pt seen by PT to address right hip pain, muscle tightness, strength, and functional mobility. Pt with good tolerance of the session with minimal to no lasting increase in pain or discomfort.  Pt challenged with hip abduction strengthening secondary to muscle tightness and weakness, however pt demonstrating mild improvement following manual therapy. PT provided intermittent verbal/tactile cues for optimal form and alignment. Patient will continue to benefit from skilled PT services to modify and progress therapeutic interventions, address functional mobility deficits, address ROM deficits, address strength deficits, analyze and address soft tissue restrictions, analyze and cue movement patterns, analyze and modify body mechanics/ergonomics, assess and modify postural abnormalities, address imbalance/dizziness, and instruct in home and community integration to attain remaining goals. []  See Plan of Care  []  See progress note/recertification  []  See Discharge Summary         Progress towards goals / Updated goals:  Goal: Pt to be compliant with initial HEP to improve hip strength for ease of transfers, stair negotiation. Status at last note/certification: Established and reviewed with Pt  Long Term Goals: To be accomplished in 5 weeks:  Goal: Pt to increase right hip flexion AROM to 95 deg without increased pain for ease of getting in/out of vehicle, dressing. Status at last note/certification: hip flexion 85 deg with pain  Goal: Pt to increase right hip strength to 4+/5 grossly to negotiate stairs with reciprocal pattern for ease navigating in home. Status at last note/certification: hip flex 4-/5, hip ABD/ADD 3+/5, hip Ext 3+/5, hip ER/IR 3+/5  Goal: Pt to ambulate household/community distances without gait deviations or A.D for increased independence. Status at last note/certification: SPC in home, community  Goal: Pt to report < 3/10 pain at worst to be able to sleep through the night comfortably. Status at last note/certification: 5/21 pain at worst  Goal: Pt to report FOTO score of 58 pts to show improved function and quality of life.   Status at last note/certification: FOTO 40 pts    PLAN  [x]  Upgrade activities as tolerated     [x]  Continue plan of care  []  Update interventions per flow sheet       []  Discharge due to:_  []  Other:_      Alix Pimentel, PT 7/25/2022  8:23 AM    Future Appointments   Date Time Provider José Jarvis   7/27/2022  8:15 AM Christy Nash, PT [facility]   8/1/2022  9:00 AM Catarino [address] [place] [address]   8/3/2022  9:00 AM [facility]   8/8/2022  9:00 AM [facility]   8/10/2022  9:00 AM Christy Nash, PT [facility]   8/15/2022  9:00 AM [facility]   8/17/2022  9:00 AM Martha Walters, PT [facility]

## 2022-07-27 ENCOUNTER — HOSPITAL ENCOUNTER (OUTPATIENT)
Dept: PHYSICAL THERAPY | Age: 82
Discharge: HOME | End: 2022-07-27
Payer: MEDICARE

## 2022-07-27 PROCEDURE — 97110 THERAPEUTIC EXERCISES: CPT

## 2022-07-27 PROCEDURE — 97112 NEUROMUSCULAR REEDUCATION: CPT

## 2022-07-27 PROCEDURE — 97140 MANUAL THERAPY 1/> REGIONS: CPT

## 2022-07-27 NOTE — PROGRESS NOTES
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 OBJECTIVE    16 min Therapeutic Exercise:  [] See flow sheet :   Rationale: increase ROM, increase strength, improve coordination, improve balance, and increase proprioception to improve the patients ability to tolerate functional mobility and daily routine     16 min Neuromuscular Re-education:  []  See flow sheet :   Rationale: increase strength, improve coordination, improve balance, and increase proprioception  to improve the patients ability to safely navigate her environment     8 min Manual Therapy:  STM/TPR to it band and hip flexor on right, scar tissue massage   The manual therapy interventions were performed at a separate and distinct time from the therapeutic activities interventions. Rationale: decrease pain, increase ROM, increase tissue extensibility, decrease trigger points, and increase postural awareness to tolerate functional mobility and movement of right leg.           With   [] TE   [] TA   [] neuro   [] other: Patient Education: [x] Review HEP    [] Progressed/Changed HEP based on:   [] positioning   [] body mechanics   [] transfers   [] heat/ice application    [] other:      Pain Level (0-10 scale) post treatment: 2/10    ASSESSMENT/Changes in Function: Pt seen by PT to address Right hip pain, strength, balance and functional mobility. Pt with good tolerance of the session with minimal to no lasting increase in pain or discomfort. Pt challenged with modified SLS activity secondary to impaired balance. Pt unable to perform external rotation of right hip secondary to muscle tightness and limited hip ROM. PT provided intermittent verbal/tactile cues for optimal form and alignment. Patient will continue to benefit from skilled PT services to modify and progress therapeutic interventions, address functional mobility deficits, address ROM deficits, address strength deficits, analyze and address soft tissue restrictions, analyze and cue movement patterns, analyze and modify body mechanics/ergonomics, assess and modify postural abnormalities, address imbalance/dizziness, and instruct in home and community integration to attain remaining goals. []  See Plan of Care  []  See progress note/recertification  []  See Discharge Summary         Progress towards goals / Updated goals:  Goal: Pt to be compliant with initial HEP to improve hip strength for ease of transfers, stair negotiation. Status at last note/certification: Established and reviewed with Pt  Long Term Goals: To be accomplished in 5 weeks:  Goal: Pt to increase right hip flexion AROM to 95 deg without increased pain for ease of getting in/out of vehicle, dressing. Status at last note/certification: hip flexion 85 deg with pain  Goal: Pt to increase right hip strength to 4+/5 grossly to negotiate stairs with reciprocal pattern for ease navigating in home. Status at last note/certification: hip flex 4-/5, hip ABD/ADD 3+/5, hip Ext 3+/5, hip ER/IR 3+/5  Goal: Pt to ambulate household/community distances without gait deviations or A.D for increased independence.   Status at last note/certification: SPC in home, community  Goal: Pt to report < 3/10 pain at worst to be able to sleep through the night comfortably. Status at last note/certification: 9/66 pain at worst  Goal: Pt to report FOTO score of 58 pts to show improved function and quality of life.   Status at last note/certification: FOTO 40 pts       PLAN  [x]  Upgrade activities as tolerated     [x]  Continue plan of care  []  Update interventions per flow sheet       []  Discharge due to:_  []  Other:_      Jessica Wheat, PT 7/27/2022  8:12 AM    Future Appointments   Date Time Provider José Jarvis   7/27/2022  8:15 AM Renée Dsouza, PT [facility]   8/1/2022  9:00 AM Catarino [address] [place] [address]   8/3/2022  9:00 AM [facility]   8/8/2022  9:00 AM [facility]   8/10/2022  9:00 AM Renée Dsouza, PT [facility]   8/15/2022  9:00 AM [facility]   8/17/2022  9:00 AM Marli Walters, PT [facility]

## 2022-08-01 ENCOUNTER — HOSPITAL ENCOUNTER (OUTPATIENT)
Dept: PHYSICAL THERAPY | Age: 82
Discharge: HOME | End: 2022-08-01
Payer: MEDICARE

## 2022-08-01 PROCEDURE — 97140 MANUAL THERAPY 1/> REGIONS: CPT

## 2022-08-01 PROCEDURE — 97112 NEUROMUSCULAR REEDUCATION: CPT

## 2022-08-01 PROCEDURE — 97110 THERAPEUTIC EXERCISES: CPT

## 2022-08-01 NOTE — PROGRESS NOTES
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 Sleeping is still difficult  Can;' get comfortable, toss and turn    OBJECTIVE        25 min Therapeutic Exercise:  [] See flow sheet :   Rationale: increase ROM and increase strength to improve the patients ability to improve ease of gait, functional mobility     10 min Neuromuscular Re-education:  []  See flow sheet :   Rationale: increase ROM, increase strength, and improve coordination  to improve the patients ability to increase  hip stability    10 min Manual Therapy:  STM to right proximal quads and hip flexors, glutes   The manual therapy interventions were performed at a separate and distinct time from the therapeutic activities interventions.   Rationale: decrease pain, increase ROM, and increase tissue extensibility to improve ease of sleep, gait, transfers and stairs        With   [] TE   [] TA   [] neuro   [] other: Patient Education: [x] Review HEP    [] Progressed/Changed HEP based on:   [] positioning   [] body mechanics   [] transfers   [] heat/ice application    [] other:      Other Objective/Functional Measures: ex's per card     Pain Level (0-10 scale) post treatment: 1-2    ASSESSMENT/Changes in Function: pt seen today to address right hip pain. MT to right hip and scar to improve mobility, and function. Progressing toward LTG #4  pain at worst has decrease to 4/10, however still effecting sleep. Patient will continue to benefit from skilled PT services to modify and progress therapeutic interventions, address functional mobility deficits, address ROM deficits, address strength deficits, analyze and address soft tissue restrictions, analyze and cue movement patterns, analyze and modify body mechanics/ergonomics, assess and modify postural abnormalities, address imbalance/dizziness, and instruct in home and community integration to attain remaining goals. []  See Plan of Care  []  See progress note/recertification  []  See Discharge Summary         Progress towards goals / Updated goals:  Goal: Pt to be compliant with initial HEP to improve hip strength for ease of transfers, stair negotiation. Status at last note/certification: Established and reviewed with Pt  Met  Long Term Goals: To be accomplished in 5 weeks:  Goal: Pt to increase right hip flexion AROM to 95 deg without increased pain for ease of getting in/out of vehicle, dressing. Status at last note/certification: hip flexion 85 deg with pain  Goal: Pt to increase right hip strength to 4+/5 grossly to negotiate stairs with reciprocal pattern for ease navigating in home. Status at last note/certification: hip flex 4-/5, hip ABD/ADD 3+/5, hip Ext 3+/5, hip ER/IR 3+/5  Progressing: hip flexion 4/5, abduction 4-/5 (2022)  Goal: Pt to ambulate household/community distances without gait deviations or A.D for increased independence. Status at last note/certification: SPC in home, community  Goal: Pt to report < 3/10 pain at worst to be able to sleep through the night comfortably.   Status at last note/certification:  pain at worst  Progressin/10  still disrupting sleep, unable to find position of comfort (2022)  Goal: Pt to report FOTO score of 58 pts to show improved function and quality of life.   Status at last note/certification: FOTO 40 pts  Assess closer to MD note    PLAN  []  Upgrade activities as tolerated     []  Continue plan of care  []  Update interventions per flow sheet       []  Discharge due to:_  []  Other:_      Vikash Berg, PTA 8/1/2022  9:12 AM    Future Appointments   Date Time Provider José Jarvis   8/3/2022  9:00 AM [facility]   8/8/2022  9:00 AM [facility]   8/10/2022  9:00 AM Paramjit Li, [facility]   8/15/2022  9:00 AM Juju Boswell, [facility]   8/17/2022  9:00 [facility]

## 2022-08-03 ENCOUNTER — HOSPITAL ENCOUNTER (OUTPATIENT)
Dept: PHYSICAL THERAPY | Age: 82
Discharge: HOME | End: 2022-08-03
Payer: MEDICARE

## 2022-08-03 PROCEDURE — 97140 MANUAL THERAPY 1/> REGIONS: CPT

## 2022-08-03 PROCEDURE — 97110 THERAPEUTIC EXERCISES: CPT

## 2022-08-03 PROCEDURE — 97112 NEUROMUSCULAR REEDUCATION: CPT

## 2022-08-03 NOTE — PROGRESS NOTES
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]  Other low back pain [M54.59]    SUBJECTIVE  Pain Level (0-10 scale): 1  Any medication changes, allergies to medications, adverse drug reactions, diagnosis change, or new procedure performed?: [x] No    [] Yes (see summary sheet for update)  Subjective functional status/changes:   [] No changes reported  I only use the cane a \"security blanket\"    OBJECTIVE      25 min Therapeutic Exercise:  [] See flow sheet :   Rationale: increase ROM, increase strength, and improve coordination to improve the patients ability to more easily ambulate in home and community, perform household tasks     8 min Neuromuscular Re-education:  []  See flow sheet :   Rationale: increase strength and improve coordination  to improve the patients ability to increase hi stability    12 min Manual Therapy:  STM to right proximal quad, hip flexors, glute medius   The manual therapy interventions were performed at a separate and distinct time from the therapeutic activities interventions.   Rationale: increase ROM and increase tissue extensibility to reduce c/o stiffness to ease gait, transfers          With   [] TE   [] TA   [] neuro   [] other: Patient Education: [x] Review HEP    [] Progressed/Changed HEP based on:   [] positioning   [] body mechanics   [] transfers   [] heat/ice application    [] other:      Other Objective/Functional Measures: low amplitude clams to reduce strain and symptoms in right LE     Pain Level (0-10 scale) post treatment: 1    ASSESSMENT/Changes in Function: Pt seen today to address right hip pain and stiffness s/p SEBAS. Pt reports pain with SL  ER and abduction. To ease strain on this muscle group, exercise was modified to low amplitude with pillow between knees. Improved tolerance with this modification. Progressing toward LTG # 3 as pt uses SPC for confidence rather than support. Minimal to no gait deviations noted without AD, heel toe sequence present. Patient will continue to benefit from skilled PT services to modify and progress therapeutic interventions, address functional mobility deficits, address ROM deficits, address strength deficits, analyze and address soft tissue restrictions, analyze and cue movement patterns, analyze and modify body mechanics/ergonomics, assess and modify postural abnormalities, address imbalance/dizziness, and instruct in home and community integration to attain remaining goals. []  See Plan of Care  []  See progress note/recertification  []  See Discharge Summary         Progress towards goals / Updated goals:  Goal: Pt to be compliant with initial HEP to improve hip strength for ease of transfers, stair negotiation. Status at last note/certification: Established and reviewed with Pt  Met  Long Term Goals: To be accomplished in 5 weeks:  Goal: Pt to increase right hip flexion AROM to 95 deg without increased pain for ease of getting in/out of vehicle, dressing. Status at last note/certification: hip flexion 85 deg with pain  Goal: Pt to increase right hip strength to 4+/5 grossly to negotiate stairs with reciprocal pattern for ease navigating in home. Status at last note/certification: hip flex 4-/5, hip ABD/ADD 3+/5, hip Ext 3+/5, hip ER/IR 3+/5  Progressing: hip flexion 4/5, abduction 4-/5 (8/1/2022)  Goal: Pt to ambulate household/community distances without gait deviations or A.D for increased independence.   Status at last note/certification: SPC in home, community  Progressing: still using SPC, not necessarily for support, mainly for confidence. (8/3/2022)  Goal: Pt to report < 3/10 pain at worst to be able to sleep through the night comfortably. Status at last note/certification:  pain at worst  Progressin/10  still disrupting sleep, unable to find position of comfort (2022)  Goal: Pt to report FOTO score of 58 pts to show improved function and quality of life.   Status at last note/certification: FOTO 40 pts  Assess closer to MD note    PLAN  [x]  Upgrade activities as tolerated     []  Continue plan of care  []  Update interventions per flow sheet       []  Discharge due to:_  []  Other:_      Celso Vences, PTA 8/3/2022  9:05 AM    Future Appointments   Date Time Provider José Jarvis   2022  9:00 AM [facility]   8/10/2022  9:00 AM Angelica Schofield, PT [facility]   8/15/2022  9:00 [facility]   2022  9:00 AM Tat[facility]na Walters[facility]

## 2022-08-08 ENCOUNTER — HOSPITAL ENCOUNTER (OUTPATIENT)
Dept: PHYSICAL THERAPY | Age: 82
Discharge: HOME | End: 2022-08-08
Payer: MEDICARE

## 2022-08-08 PROCEDURE — 97110 THERAPEUTIC EXERCISES: CPT

## 2022-08-08 PROCEDURE — 97140 MANUAL THERAPY 1/> REGIONS: CPT

## 2022-08-08 PROCEDURE — 97112 NEUROMUSCULAR REEDUCATION: CPT

## 2022-08-08 NOTE — PROGRESS NOTES
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]  Other low back pain [M54.59]    SUBJECTIVE  Pain Level (0-10 scale): 1  Any medication changes, allergies to medications, adverse drug reactions, diagnosis change, or new procedure performed?: [x] No    [] Yes (see summary sheet for update)  Subjective functional status/changes:   [] No changes reported  The scar is  and the surgery was in January    OBJECTIVE      27 min Therapeutic Exercise:  [] See flow sheet :   Rationale: increase ROM and increase strength to improve the patients ability to perform daily tasks     8 min Neuromuscular Re-education:  []  See flow sheet :   Rationale: increase strength, improve coordination, and improve balance  to improve the patients ability to increase hip stability for gait    10 min Manual Therapy:  STM to right hip flexors, scar massage   The manual therapy interventions were performed at a separate and distinct time from the therapeutic activities interventions. Rationale: decrease pain and increase ROM to improve tissue mobility, ease of hip mobility          With   [] TE   [] TA   [] neuro   [] other: Patient Education: [x] Review HEP    [] Progressed/Changed HEP based on:   [] positioning   [] body mechanics   [] transfers   [] heat/ice application    [] other:      Other Objective/Functional Measures: ex's per card     Pain Level (0-10 scale) post treatment: 1    ASSESSMENT/Changes in Function: pt reports less stiffness in right hip, but  along scar effecting positional tolerance, and sleep. Patient will continue to benefit from skilled PT services to modify and progress therapeutic interventions, address functional mobility deficits, address ROM deficits, address strength deficits, analyze and address soft tissue restrictions, analyze and cue movement patterns, analyze and modify body mechanics/ergonomics, assess and modify postural abnormalities, address imbalance/dizziness, and instruct in home and community integration to attain remaining goals. []  See Plan of Care  []  See progress note/recertification  []  See Discharge Summary         Progress towards goals / Updated goals:  Goal: Pt to be compliant with initial HEP to improve hip strength for ease of transfers, stair negotiation. Status at last note/certification: Established and reviewed with Pt  Met  Long Term Goals: To be accomplished in 5 weeks:  Goal: Pt to increase right hip flexion AROM to 95 deg without increased pain for ease of getting in/out of vehicle, dressing. Status at last note/certification: hip flexion 85 deg with pain  Goal: Pt to increase right hip strength to 4+/5 grossly to negotiate stairs with reciprocal pattern for ease navigating in home. Status at last note/certification: hip flex 4-/5, hip ABD/ADD 3+/5, hip Ext 3+/5, hip ER/IR 3+/5  Progressing: hip flexion 4/5, abduction 4-/5 (2022)  Goal: Pt to ambulate household/community distances without gait deviations or A.D for increased independence. Status at last note/certification: SPC in home, community  Progressing: still using SPC, not necessarily for support, mainly for confidence. (8/3/2022)  Goal: Pt to report < 3/10 pain at worst to be able to sleep through the night comfortably. Status at last note/certification:  pain at worst  Progressin/10  still disrupting sleep, unable to find position of comfort (2022)  Goal: Pt to report FOTO score of 58 pts to show improved function and quality of life.   Status at last note/certification: FOTO 40 pts  Assess closer to MD note    PLAN  [x]  Upgrade activities as tolerated     []  Continue plan of care  []  Update interventions per flow sheet       []  Discharge due to:_  []  Other:_      Dewey Israel, [place] 8/8/2022  9:06 AM    Future Appointments   Date Time Provider José Jarvis   8/10/2022  9:00 AM Br[facility] Jimenez[facility]   8/15/2022  9:00 AM Henri De La Torre, PT [facility]   8/17/2022  9:00 AM Glendy Walters, PT [facility]

## 2022-08-10 ENCOUNTER — HOSPITAL ENCOUNTER (OUTPATIENT)
Dept: PHYSICAL THERAPY | Age: 82
Discharge: HOME | End: 2022-08-10
Payer: MEDICARE

## 2022-08-10 PROCEDURE — 97140 MANUAL THERAPY 1/> REGIONS: CPT

## 2022-08-10 PROCEDURE — 97110 THERAPEUTIC EXERCISES: CPT

## 2022-08-10 NOTE — PROGRESS NOTES
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 OBJECTIVE    25 min Therapeutic Exercise:  [] See flow sheet :   Rationale: increase ROM, increase strength, improve coordination, and improve balance to improve the patients ability to tolerate functional mobility and daily routine      15 min Manual Therapy:  lateral and inferior glides to right hip to increase adduction and internal rotation. The manual therapy interventions were performed at a separate and distinct time from the therapeutic activities interventions. Rationale: decrease pain, increase ROM, increase tissue extensibility, decrease trigger points, and increase postural awareness to tolerate functional mobility and daily routine. With   [] TE   [] TA   [] neuro   [] other: Patient Education: [x] Review HEP    [] Progressed/Changed HEP based on:   [] positioning   [] body mechanics   [] transfers   [] heat/ice application    [] other:      Pain Level (0-10 scale) post treatment: 3/10    ASSESSMENT/Changes in Function: Pt seen by PT to address Right hip pain, strength, ROM, and functional mobility. Pt with good tolerance of the session with minimal to no lasting increase in pain or discomfort.  Pt challenged with tolerating sidelying position secondary to decreased joint mobility required for internal rotation and adduction. Pt with improved tolerance of sidelying on left side following inferior and lateral hip glides. PT provided intermittent verbal/tactile cues for optimal form and alignment. Patient will continue to benefit from skilled PT services to modify and progress therapeutic interventions, address functional mobility deficits, address ROM deficits, address strength deficits, analyze and address soft tissue restrictions, analyze and cue movement patterns, analyze and modify body mechanics/ergonomics, assess and modify postural abnormalities, address imbalance/dizziness, and instruct in home and community integration to attain remaining goals. []  See Plan of Care  []  See progress note/recertification  []  See Discharge Summary         Progress towards goals / Updated goals:  Goal: Pt to be compliant with initial HEP to improve hip strength for ease of transfers, stair negotiation. Status at last note/certification: Established and reviewed with Pt  Met  Long Term Goals: To be accomplished in 5 weeks:  Goal: Pt to increase right hip flexion AROM to 95 deg without increased pain for ease of getting in/out of vehicle, dressing. Status at last note/certification: hip flexion 85 deg with pain  Goal: Pt to increase right hip strength to 4+/5 grossly to negotiate stairs with reciprocal pattern for ease navigating in home. Status at last note/certification: hip flex 4-/5, hip ABD/ADD 3+/5, hip Ext 3+/5, hip ER/IR 3+/5  Progressing: hip flexion 4/5, abduction 4-/5 (8/1/2022)  Goal: Pt to ambulate household/community distances without gait deviations or A.D for increased independence.   Status at last note/certification: SPC in home, community  Progressing: still using SPC, not necessarily for support, mainly for confidence. (8/3/2022)  Goal: Pt to report < 3/10 pain at worst to be able to sleep through the night comfortably. Status at last note/certification: 0/05 pain at worst  Progressin/10  still disrupting sleep, unable to find position of comfort (2022)  Goal: Pt to report FOTO score of 58 pts to show improved function and quality of life.   Status at last note/certification: FOTO 40 pts  Assess closer to MD note    PLAN  [x]  Upgrade activities as tolerated     [x]  Continue plan of care  []  Update interventions per flow sheet       []  Discharge due to:_  []  Other:_      Nicki Balderas, PT 8/10/2022  9:05 AM    Future Appointments   Date Time Provider José Jarvis   8/15/2022  9:00 AM Evin Martinez, PT [facility]   2022  9:00 AM Viktor Walters, PT [facility]

## 2022-08-15 ENCOUNTER — HOSPITAL ENCOUNTER (OUTPATIENT)
Dept: PHYSICAL THERAPY | Age: 82
Discharge: HOME | End: 2022-08-15
Payer: MEDICARE

## 2022-08-15 PROCEDURE — 97110 THERAPEUTIC EXERCISES: CPT

## 2022-08-15 PROCEDURE — 97140 MANUAL THERAPY 1/> REGIONS: CPT

## 2022-08-15 NOTE — PROGRESS NOTES
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 OBJECTIVE    25 min Therapeutic Exercise:  [] See flow sheet :   Rationale: increase ROM, increase strength, improve coordination, and improve balance to improve the patients ability to tolerate functional mobility. 15 min Manual Therapy:  lateral/inferior hip glides grade 2-3. The manual therapy interventions were performed at a separate and distinct time from the therapeutic activities interventions. Rationale: decrease pain, increase ROM, increase tissue extensibility, decrease trigger points, and increase postural awareness to tolerate functional mobility and daily routine. With   [] TE   [] TA   [] neuro   [] other: Patient Education: [x] Review HEP    [] Progressed/Changed HEP based on:   [] positioning   [] body mechanics   [] transfers   [] heat/ice application    [] other:      Other Objective/Functional Measures: re-assessed goals. Pain Level (0-10 scale) post treatment: 1-2/10    ASSESSMENT/Changes in Function: Pt has attended skilled therapy 9 and has made good progress thus far.   Pt reports the following:    Functional Gains: walking, endurance (pt reports she isn't out of breath as quickly),  Functional Deficits: stairs,   % improvement: 75%  Pain   Average: 3-4/10                  Best: 1/10                Worst: 4/10  Patient Goal: \"I want to be able to do stuff around the house without feeling out of breath. \"  Pt would benefit from continued skilled therapy to address hip joint mobility, strength, ROM, and functional mobility. Patient will continue to benefit from skilled PT services to modify and progress therapeutic interventions, address functional mobility deficits, address ROM deficits, address strength deficits, analyze and address soft tissue restrictions, analyze and cue movement patterns, analyze and modify body mechanics/ergonomics, assess and modify postural abnormalities, address imbalance/dizziness, and instruct in home and community integration to attain remaining goals. [x]  See Plan of Care  []  See progress note/recertification  []  See Discharge Summary         Progress towards goals / Updated goals:  Goal: Pt to be compliant with initial HEP to improve hip strength for ease of transfers, stair negotiation. Status at last note/certification: Progressing: pt reports compliance with HEP  Goal: Pt to increase right hip strength to 4+/5 grossly to negotiate stairs with reciprocal pattern for ease navigating in home. Status at last note/certification: progressing: right hip: 4/5, hip ER/IR: 3+/5, hip abd/add: 3+/5 (8/15/22)  Goal: Pt to ambulate household/community distances without gait deviations or A.D for increased independence. Status at last note/certification:  Progressing: pt ambulating without SPC at time of session. (8/15/22)  Goal: Pt to report < 3/10 pain at worst to be able to sleep through the night comfortably. Status at last note/certification: progressin/10 at worst. (8/15/22)  Goal: Pt to report FOTO score of 58 pts to show improved function and quality of life.   Status at last note/certification: progressing; FOTO 56 pts (8/15/22)  Goal: Pt to ascend/descend stairs with no more than 1 UE support with reciprocal pattern for improved functional mobility  Status at last note/certification: with UE support and step to pattern.        PLAN  []  Upgrade activities as tolerated     []  Continue plan of care  []  Update interventions per flow sheet       []  Discharge due to:_  []  Other:_      Sole Carrasco, PT 8/15/2022  9:38 AM    Future Appointments   Date Time Provider José Jarvis   8/17/2022  9:00 AM J Carlos Walters, PT [facility]

## 2022-08-15 NOTE — PROGRESS NOTES
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]  Other low back pain [M54.59]  Payor: [place] MEDICARE / Plan: VA MEDICARE PART A & B / Product Type: Medicare /  Onset Date:22 (surgery)      Prior Hospitalization: see medical history Provider#: [id number]   Medications: Verified on Patient Summary List    Comorbidities: Arthritis; hx Breast CA - in remission; Osteoporosis; Scoliosis; Thyroid problems   Prior Level of Function: Lives in 1-story home with spouse; functionally independent                   Visits from Start of Care: 9    Missed Visits: 0    The Plan of Care and following information is based on the patient's current status:  Goal: Pt to be compliant with initial HEP to improve hip strength for ease of transfers, stair negotiation. Status at last note/certification: Established and reviewed with Pt  Current: Progressing: pt reports compliance with HEP  Goal: Pt to increase right hip flexion AROM to 95 deg without increased pain for ease of getting in/out of vehicle, dressing. Status at last note/certification: hip flexion 85 deg with pain  Current: goal met: 96 degrees hip flexion (8/15/22)   Goal: Pt to increase right hip strength to 4+/5 grossly to negotiate stairs with reciprocal pattern for ease navigating in home.   Status at last note/certification: hip flex 4-/5, hip ABD/ADD 3+/5, hip Ext 3+/5, hip ER/IR 3+/5  Progressing: hip flexion 4/5, abduction 4-/5 (2022)  Current: progressing: right hip: 4/5, hip ER/IR: 3+/5, hip abd/add: 3+/5 (8/15/22)  Goal: Pt to ambulate household/community distances without gait deviations or A.D for increased independence. Status at last note/certification: still using SPC, not necessarily for support, mainly for confidence. (8/3/2022)  Current: Progressing: pt ambulating without SPC at time of session. (8/15/22)   Goal: Pt to report < 3/10 pain at worst to be able to sleep through the night comfortably. Status at last note/certification: Progressin/10  still disrupting sleep, unable to find position of comfort (2022)  Current: progressin/10 at worst. (8/15/22)  Goal: Pt to report FOTO score of 58 pts to show improved function and quality of life. Status at last note/certification: FOTO 40 pts  Current: progressing; FOTO 56 pts (8/15/22)     Problems/ barriers to goal attainment: none     Problem List: pain affecting function, decrease ROM, decrease strength, edema affecting function, impaired gait/ balance, decrease ADL/ functional abilitiies, decrease activity tolerance, decrease flexibility/ joint mobility, and decrease transfer abilities    Treatment Plan: Therapeutic exercise, Therapeutic activities, Neuromuscular re-education, Physical agent/modality, Gait/balance training, Manual therapy, Aquatic therapy, Patient education, Self Care training, Functional mobility training, Home safety training, and Stair training     Patient Goal (s) has been updated and includes: \"I want to be able to do stuff around the house without feeling out of breath. \"\"     Goals for this certification period to be accomplished in 3 weeks:  Goal: Pt to be compliant with initial HEP to improve hip strength for ease of transfers, stair negotiation. Status at last note/certification: Progressing: pt reports compliance with HEP  Goal: Pt to increase right hip strength to 4+/5 grossly to negotiate stairs with reciprocal pattern for ease navigating in home.   Status at last note/certification: progressing: right hip: 4/5, hip ER/IR: 3+/5, hip abd/add: 3+/5 (8/15/22)  Goal: Pt to ambulate household/community distances without gait deviations or A.D for increased independence. Status at last note/certification:  Progressing: pt ambulating without SPC at time of session. (8/15/22)   Goal: Pt to report < 3/10 pain at worst to be able to sleep through the night comfortably. Status at last note/certification: progressin/10 at worst. (8/15/22)  Goal: Pt to report FOTO score of 58 pts to show improved function and quality of life. Status at last note/certification: progressing; FOTO 56 pts (8/15/22)   Goal: Pt to ascend/descend stairs with no more than 1 UE support with reciprocal pattern for improved functional mobility  Status at last note/certification: with UE support and step to pattern. Frequency / Duration: Patient to be seen 2 times per week for 3 weeks:    Assessment / Recommendations:Pt has attended skilled therapy 9 and has made good progress thus far. Pt reports the following:    Functional Gains: walking, endurance (pt reports she isn't out of breath as quickly),  Functional Deficits: stairs,   % improvement: 75%  Pain   Average: 3-10       Best: 1/10     Worst: 10  Patient Goal: \"I want to be able to do stuff around the house without feeling out of breath. \"  Pt would benefit from continued skilled therapy to address hip joint mobility, strength, ROM, and functional mobility. Certification Period: 8/15/22-22    Sukumar Sierra, PT 8/15/2022 9:04 AM    ________________________________________________________________________  I certify that the above Therapy Services are being furnished while the patient is under my care. I agree with the treatment plan and certify that this therapy is necessary. [] I have read the above and request that my patient continue as recommended.   [] I have read the above report and request that my patient continue therapy with the following changes/special instructions: ______________________________________  [] I have read the above report and request that my patient be discharged from therapy    Physician's Signature:____________Date:_________TIME:________     Codie Sanchez MD  ** Signature, Date and Time must be completed for valid certification **    Please sign and return to [facility] - [address]  [phone] fax

## 2022-08-17 ENCOUNTER — HOSPITAL ENCOUNTER (OUTPATIENT)
Dept: PHYSICAL THERAPY | Age: 82
Discharge: HOME | End: 2022-08-17
Payer: MEDICARE

## 2022-08-17 PROCEDURE — 97140 MANUAL THERAPY 1/> REGIONS: CPT

## 2022-08-17 PROCEDURE — 97110 THERAPEUTIC EXERCISES: CPT

## 2022-08-17 NOTE — PROGRESS NOTES
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 I'm just stiff in the right hip. \"    OBJECTIVE    31 min Therapeutic Exercise:  [] See flow sheet :   Rationale: increase ROM, increase strength, and improve coordination to improve the patients ability to increase hip strength for ease of transfers, standing, amb tolerance without A.D.     10 min Manual Therapy:  Inferior/Lateral hip glides, Gr III; hip distraction glides; hip ER/IR mobs, Gr III   The manual therapy interventions were performed at a separate and distinct time from the therapeutic activities interventions. Rationale: decrease pain, increase ROM, and increase tissue extensibility to improve hip mobility for ease of transfers, standing, amb activities          With   [] TE   [] TA   [] neuro   [] other: Patient Education: [x] Review HEP    [] Progressed/Changed HEP based on:   [] positioning   [] body mechanics   [] transfers   [] heat/ice application    [] other:      Other Objective/Functional Measures: Performed full program per flow sheet, adding 1/2 prone exercises. Pain Level (0-10 scale) post treatment: 0/10    ASSESSMENT/Changes in Function: Pt noted resolution of right hip stiffness after manual interventions.   Pt was able to perform all exercise interventions, even addition of 1/2 prone exercises without increased discomfort to right hip. Pt continues to report pain lying on right side, so avoids at this time. Pt ambulated with safe gait technique without A.D in clinic this date. Pt progressing towards all goals and will most likely finish out remaining visits and transition to independent management with HEP. Patient will continue to benefit from skilled PT services to address functional mobility deficits, address ROM deficits, address strength deficits, analyze and address soft tissue restrictions, analyze and cue movement patterns, analyze and modify body mechanics/ergonomics, assess and modify postural abnormalities, address imbalance/dizziness, and instruct in home and community integration to attain remaining goals. []  See Plan of Care  []  See progress note/recertification  []  See Discharge Summary         Progress towards goals / Updated goals:  Goal: Pt to be compliant with initial HEP to improve hip strength for ease of transfers, stair negotiation. Status at last note/certification: Progressing: pt reports compliance with HEP  Current:  Goal: Pt to increase right hip strength to 4+/5 grossly to negotiate stairs with reciprocal pattern for ease navigating in home. Status at last note/certification: progressing: right hip: 4/5, hip ER/IR: 3+/5, hip abd/add: 3+/5 (8/15/22)  Current:  Goal: Pt to ambulate household/community distances without gait deviations or A.D for increased independence. Status at last note/certification:  Progressing: pt ambulating without SPC at time of session. (8/15/22)   Current:  Goal: Pt to report < 3/10 pain at worst to be able to sleep through the night comfortably. Status at last note/certification: progressin/10 at worst. (8/15/22)  Current:  Goal: Pt to report FOTO score of 58 pts to show improved function and quality of life.   Status at last note/certification: progressing; FOTO 56 pts (8/15/22)   Current:  Goal: Pt to ascend/descend stairs with no more than 1 UE support with reciprocal pattern for improved functional mobility  Status at last note/certification: with UE support and step to pattern.    Current:    PLAN  [x]  Upgrade activities as tolerated     [x]  Continue plan of care  []  Update interventions per flow sheet       []  Discharge due to:_  []  Other:_      Adrian Walters PT 8/17/2022  9:04 AM    Future Appointments   Date Time Provider José Jarvis   8/23/2022 12:00 PM Adrian Walters, [facility]   8/26/2022  9:00 AM Adrian Walters, [facility]   8/29/2022  9:00 AM Adrian Walters, [facility]

## 2022-08-23 ENCOUNTER — HOSPITAL ENCOUNTER (OUTPATIENT)
Dept: PHYSICAL THERAPY | Age: 82
Discharge: HOME | End: 2022-08-23
Payer: MEDICARE

## 2022-08-23 PROCEDURE — 97110 THERAPEUTIC EXERCISES: CPT

## 2022-08-23 PROCEDURE — 97140 MANUAL THERAPY 1/> REGIONS: CPT

## 2022-08-23 NOTE — PROGRESS NOTES
PT DAILY TREATMENT NOTE     Patient Name: April 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 \"    OBJECTIVE    30 min Therapeutic Exercise:  [] See flow sheet :   Rationale: increase ROM, increase strength, and improve coordination to improve the patients ability to increase hip strength for ease of transfers, standing, amb tolerance without A.D.     8 min Manual Therapy:  Inferior/Lateral hip glides, Gr III; hip distraction glides; hip ER/IR mobs, Gr III   The manual therapy interventions were performed at a separate and distinct time from the therapeutic activities interventions. Rationale: decrease pain, increase ROM, and increase tissue extensibility to improve hip mobility for ease of transfers, standing, amb activities          With   [] TE   [] TA   [] neuro   [] other: Patient Education: [x] Review HEP    [] Progressed/Changed HEP based on:   [] positioning   [] body mechanics   [] transfers   [] heat/ice application    [] other:      Other Objective/Functional Measures: Performed full program per flow sheet, adding 1/2 prone exercises.      Pain Level (0-10 scale) post treatment: 1/10    ASSESSMENT/Changes in Function: Pt noted resolution of right hip stiffness after manual interventions. Pt was able to perform all exercise interventions. Pt ambulated with safe gait technique without A.D in clinic this date. Pt continually progressing towards all goals and is on track to finish out remaining visits and transition to independent management with HEP. Patient will continue to benefit from skilled PT services to address functional mobility deficits, address ROM deficits, address strength deficits, analyze and address soft tissue restrictions, analyze and cue movement patterns, analyze and modify body mechanics/ergonomics, assess and modify postural abnormalities, address imbalance/dizziness, and instruct in home and community integration to attain remaining goals. []  See Plan of Care  []  See progress note/recertification  []  See Discharge Summary         Progress towards goals / Updated goals:  Goal: Pt to be compliant with initial HEP to improve hip strength for ease of transfers, stair negotiation. Status at last note/certification: Progressing: pt reports compliance with HEP  Current: will give at time of discharge (22)  Goal: Pt to increase right hip strength to 4+/5 grossly to negotiate stairs with reciprocal pattern for ease navigating in home. Status at last note/certification: progressing: right hip: 4/5, hip ER/IR: 3+/5, hip abd/add: 3+/5 (8/15/22)  Current: reassess next visit (22)  Goal: Pt to ambulate household/community distances without gait deviations or A.D for increased independence. Status at last note/certification:  Progressing: pt ambulating without SPC at time of session. (8/15/22)   Current: progressing - Pt ambulating community distance without A.D but carries SPC in car just in case (22)  Goal: Pt to report < 3/10 pain at worst to be able to sleep through the night comfortably.   Status at last note/certification: progressin/10 at worst. (8/15/22)  Current: met - 3/10 pain at worst on average that is intermittent in nature (8/23/22)  Goal: Pt to report FOTO score of 58 pts to show improved function and quality of life. Status at last note/certification: progressing; FOTO 56 pts (8/15/22)   Current: will reassess at MD note (8/23/22)  Goal: Pt to ascend/descend stairs with no more than 1 UE support with reciprocal pattern for improved functional mobility  Status at last note/certification: with UE support and step to pattern.    Current: progressing - still step to pattern with 1 UE support, but will practice reciprocally at home (8/23/22)    PLAN  [x]  Upgrade activities as tolerated     [x]  Continue plan of care  []  Update interventions per flow sheet       []  Discharge due to:_  []  Other:_      Madiha Walters PT 8/23/2022  9:04 AM    Future Appointments   Date Time Provider José Jarvis   8/26/2022  9:00 AM Madiha Walters, PT [facility]   8/29/2022  9:00 AM Madiha Walters, PT [facility]

## 2022-08-26 ENCOUNTER — HOSPITAL ENCOUNTER (OUTPATIENT)
Dept: PHYSICAL THERAPY | Age: 82
Discharge: HOME | End: 2022-08-26
Payer: MEDICARE

## 2022-08-26 PROCEDURE — 97110 THERAPEUTIC EXERCISES: CPT

## 2022-08-26 PROCEDURE — 97530 THERAPEUTIC ACTIVITIES: CPT

## 2022-08-26 NOTE — PROGRESS NOTES
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]  Other low back pain [M54.59]  Payor: VA MEDICARE / Plan: VA MEDICARE PART A & B / Product Type: Medicare /  Onset Date:22 (surgery)       Prior Hospitalization: see medical history Provider#: [id number]   Medications: Verified on Patient Summary List     Comorbidities: Arthritis; hx Breast CA - in remission; Osteoporosis; Scoliosis; Thyroid problems   Prior Level of Function: Lives in 1-story home with spouse; functionally independent    Visits from Start of Care: 12    Missed Visits: 0    Reporting Period : 8/15/22 to 22    Date:2022  : 1940  [x]  Patient  Verified  Payor: Kaylee Payne / Plan: [address] [place] [address] / Product Type: Medicare /    In time:9:00  Out time:9:45  Total Treatment Time (min): 45  Visit #: 3 of 6    Medicare/BCBS Only   Total Timed Codes (min):  45 1:1 Treatment Time:  45       SUBJECTIVE  Pain Level (0-10 scale): 1/10  Any medication changes, allergies to medications, adverse drug reactions, diagnosis change, or new procedure performed?: [x] No    [] Yes (see summary sheet for update)  Subjective functional status/changes:   [] No changes reported  \"I feel pretty good today. We can do the reassessment today. I think I'm ready to be finished. \"    OBJECTIVE    25 min Therapeutic Exercise:  [] See flow sheet :   Rationale: increase strength, improve coordination, and increase proprioception to improve the patients ability to increase standing/amb tolerance for ease of ADLs    20 min Therapeutic Activity:  []  See flow sheet : goals, FOTO, HEP updated and reviewed with Pt   Rationale: increase strength, improve coordination, and improve balance  to improve the patients ability to increase stability with standing, squatting, stair negotiation           With   [] TE   [] TA   [] neuro   [] other: Patient Education: [x] Review HEP    [] Progressed/Changed HEP based on:   [] positioning   [] body mechanics   [] transfers   [] heat/ice application    [] other:      Other Objective/Functional Measures: Reassessed goals for discharge. Performed exercises per flow sheet. Updated HEP and reviewed with Pt. Pain Level (0-10 scale) post treatment: 0/10    Summary of Care:  Goal: Pt to be compliant with initial HEP to improve hip strength for ease of transfers, stair negotiation. Status at last note/certification: Progressing: pt reports compliance with HEP  Current: met - Pt given updated HEP and demonstrated 100% understanding (22)  Goal: Pt to increase right hip strength to 4+/5 grossly to negotiate stairs with reciprocal pattern for ease navigating in home. Status at last note/certification: progressing: right hip: 4/5, hip ER/IR: 3+/5, hip abd/add: 3+/5 (8/15/22)  Current: progressing - hip Flex 5/5, hip ABD 3+/5, ADD 5/5, hip ER 4/5, IR 4/5 - with mild pain  (22)  Goal: Pt to ambulate household/community distances without gait deviations or A.D for increased independence. Status at last note/certification:  Progressing: pt ambulating without SPC at time of session. (8/15/22)   Current: progressing - Pt ambulating community distance without A.D but carries SPC in car just in case (22)  Goal: Pt to report < 3/10 pain at worst to be able to sleep through the night comfortably. Status at last note/certification: progressin/10 at worst. (8/15/22)  Current: met - 3/10 pain at worst on average that is intermittent in nature (22)  Goal: Pt to report FOTO score of 58 pts to show improved function and quality of life.   Status at last note/certification: progressing; FOTO 56 pts (8/15/22)   Current: not met - FOTO 55 pts (22)  Goal: Pt to ascend/descend stairs with no more than 1 UE support with reciprocal pattern for improved functional mobility  Status at last note/certification: with UE support and step to pattern. Current: progressing - still step to pattern with 1 UE support, but will practice reciprocally at home (8/23/22)    ASSESSMENT/Changes in Function: Pt attended 12 visits consistently and made good progress with skilled physical therapy services. At time of last visit, Pt reported the following:  Functional Gains - increased ease negotiating stairs in home with reciprocal pattern, more confident when getting up at night and maneuvering to bathroom; Functional Deficits - continues to report tenderness to right hip since surgery but improving; and 80% improvement since start of care. Pt has met or is progressing towards all goals and is compliant with comprehensive HEP. Pt is appropriate for D/C at this time to continue to manage care independently and maintain long term gains made with skilled therapy.         Thank you for this referral!      PLAN  [x]Discontinue therapy: [x]Patient has reached or is progressing toward set goals      []Patient is non-compliant or has abdicated      []Due to lack of appreciable progress towards set goals    Aditi Walters, PT 8/26/2022  9:05 AM

## 2022-08-26 NOTE — PROGRESS NOTES
Physical Therapy Discharge Instructions      [facility] - [facility], [address] [place] [address]  [phone] fax    Patient: Debra Valdez  : 1940      Continue Home Exercise Program 1 times per day for 2 weeks, then decrease to 3 times per week      Continue with    [x] Ice  as needed 1 times per day     [] Heat           Follow up with MD:     [] Upon completion of therapy     [] As needed      Recommendations:     [x]   Return to activity with home program    []   Return to activity with the following modifications:       []Post Rehab Program    []Join Independent aquatic program     []Return to/join local gym      Additional Comments: It has been a pleasure working with you! I'm glad you are doing better. Keep up with your home exercises and please contact us if we can be of further assistance to you.       Lala Walters, PT 2022 9:42 AM

## 2022-08-29 ENCOUNTER — APPOINTMENT (OUTPATIENT)
Dept: PHYSICAL THERAPY | Age: 82
End: 2022-08-29
Payer: MEDICARE